# Patient Record
Sex: MALE | Race: BLACK OR AFRICAN AMERICAN | Employment: OTHER | URBAN - METROPOLITAN AREA
[De-identification: names, ages, dates, MRNs, and addresses within clinical notes are randomized per-mention and may not be internally consistent; named-entity substitution may affect disease eponyms.]

---

## 2022-01-01 ENCOUNTER — APPOINTMENT (OUTPATIENT)
Dept: CT IMAGING | Age: 72
DRG: 871 | End: 2022-01-01
Attending: EMERGENCY MEDICINE
Payer: MEDICARE

## 2022-01-01 ENCOUNTER — APPOINTMENT (OUTPATIENT)
Dept: GENERAL RADIOLOGY | Age: 72
DRG: 871 | End: 2022-01-01
Attending: EMERGENCY MEDICINE
Payer: MEDICARE

## 2022-01-01 ENCOUNTER — HOSPICE ADMISSION (OUTPATIENT)
Dept: HOSPICE | Facility: HOSPICE | Age: 72
End: 2022-01-01

## 2022-01-01 ENCOUNTER — APPOINTMENT (OUTPATIENT)
Dept: GENERAL RADIOLOGY | Age: 72
DRG: 871 | End: 2022-01-01
Attending: INTERNAL MEDICINE
Payer: MEDICARE

## 2022-01-01 ENCOUNTER — APPOINTMENT (OUTPATIENT)
Dept: NON INVASIVE DIAGNOSTICS | Age: 72
DRG: 871 | End: 2022-01-01
Attending: INTERNAL MEDICINE
Payer: MEDICARE

## 2022-01-01 ENCOUNTER — HOSPITAL ENCOUNTER (INPATIENT)
Age: 72
LOS: 4 days | DRG: 871 | End: 2022-08-29
Attending: EMERGENCY MEDICINE | Admitting: EMERGENCY MEDICINE
Payer: MEDICARE

## 2022-01-01 VITALS
DIASTOLIC BLOOD PRESSURE: 73 MMHG | BODY MASS INDEX: 14.22 KG/M2 | WEIGHT: 96 LBS | HEART RATE: 60 BPM | TEMPERATURE: 93.4 F | OXYGEN SATURATION: 83 % | RESPIRATION RATE: 24 BRPM | HEIGHT: 69 IN | SYSTOLIC BLOOD PRESSURE: 106 MMHG

## 2022-01-01 DIAGNOSIS — R94.31 ABNORMAL EKG: Primary | ICD-10-CM

## 2022-01-01 LAB
ALBUMIN SERPL-MCNC: 1.2 G/DL (ref 3.4–5)
ALBUMIN SERPL-MCNC: 1.8 G/DL (ref 3.4–5)
ALBUMIN/GLOB SERPL: 0.3 {RATIO} (ref 0.8–1.7)
ALBUMIN/GLOB SERPL: 0.4 {RATIO} (ref 0.8–1.7)
ALP SERPL-CCNC: 122 U/L (ref 45–117)
ALP SERPL-CCNC: 182 U/L (ref 45–117)
ALT SERPL-CCNC: 107 U/L (ref 16–61)
ALT SERPL-CCNC: 118 U/L (ref 16–61)
AMMONIA PLAS-SCNC: 31 UMOL/L (ref 11–32)
ANION GAP SERPL CALC-SCNC: 13 MMOL/L (ref 3–18)
ANION GAP SERPL CALC-SCNC: 13 MMOL/L (ref 3–18)
APPEARANCE UR: ABNORMAL
APTT PPP: 33 SEC (ref 23–36.4)
ARTERIAL PATENCY WRIST A: POSITIVE
AST SERPL-CCNC: 134 U/L (ref 10–38)
AST SERPL-CCNC: 49 U/L (ref 10–38)
B PERT DNA SPEC QL NAA+PROBE: NOT DETECTED
BACTERIA SPEC CULT: ABNORMAL
BACTERIA URNS QL MICRO: ABNORMAL /HPF
BASE DEFICIT BLD-SCNC: 5.3 MMOL/L
BASE DEFICIT BLD-SCNC: 6.4 MMOL/L
BASE DEFICIT BLD-SCNC: 7.6 MMOL/L
BASOPHILS # BLD: 0 K/UL (ref 0–0.1)
BASOPHILS # BLD: 0 K/UL (ref 0–0.1)
BASOPHILS NFR BLD: 0 % (ref 0–2)
BASOPHILS NFR BLD: 0 % (ref 0–2)
BDY SITE: ABNORMAL
BILIRUB SERPL-MCNC: 0.3 MG/DL (ref 0.2–1)
BILIRUB SERPL-MCNC: 0.4 MG/DL (ref 0.2–1)
BILIRUB UR QL: NEGATIVE
BNP SERPL-MCNC: 2919 PG/ML (ref 0–900)
BODY TEMPERATURE: 96
BODY TEMPERATURE: 96
BODY TEMPERATURE: 98.4
BORDETELLA PARAPERTUSSIS PCR, BORPAR: NOT DETECTED
BUN SERPL-MCNC: 187 MG/DL (ref 7–18)
BUN SERPL-MCNC: 189 MG/DL (ref 7–18)
BUN/CREAT SERPL: 41 (ref 12–20)
BUN/CREAT SERPL: 42 (ref 12–20)
C PNEUM DNA SPEC QL NAA+PROBE: NOT DETECTED
CA-I SERPL-SCNC: 1.12 MMOL/L (ref 1.12–1.32)
CALCIUM SERPL-MCNC: 8.1 MG/DL (ref 8.5–10.1)
CALCIUM SERPL-MCNC: 8.7 MG/DL (ref 8.5–10.1)
CC UR VC: ABNORMAL
CHLORIDE SERPL-SCNC: 101 MMOL/L (ref 100–111)
CHLORIDE SERPL-SCNC: 107 MMOL/L (ref 100–111)
CO2 SERPL-SCNC: 18 MMOL/L (ref 21–32)
CO2 SERPL-SCNC: 22 MMOL/L (ref 21–32)
COLOR UR: ABNORMAL
COVID-19 RAPID TEST, COVR: NOT DETECTED
CREAT SERPL-MCNC: 4.5 MG/DL (ref 0.6–1.3)
CREAT SERPL-MCNC: 4.61 MG/DL (ref 0.6–1.3)
DIFFERENTIAL METHOD BLD: ABNORMAL
DIFFERENTIAL METHOD BLD: ABNORMAL
ECHO AO ROOT DIAM: 3.4 CM
ECHO AO ROOT INDEX: 2.25 CM/M2
ECHO AV AREA PEAK VELOCITY: 1.9 CM2
ECHO AV AREA VTI: 1.7 CM2
ECHO AV AREA/BSA PEAK VELOCITY: 1.3 CM2/M2
ECHO AV AREA/BSA VTI: 1.1 CM2/M2
ECHO AV MEAN GRADIENT: 4 MMHG
ECHO AV MEAN VELOCITY: 0.9 M/S
ECHO AV PEAK GRADIENT: 7 MMHG
ECHO AV PEAK VELOCITY: 1.3 M/S
ECHO AV VELOCITY RATIO: 0.62
ECHO AV VTI: 19 CM
ECHO EST RA PRESSURE: 8 MMHG
ECHO LA DIAMETER INDEX: 1.72 CM/M2
ECHO LA DIAMETER: 2.6 CM
ECHO LA TO AORTIC ROOT RATIO: 0.76
ECHO LA VOL 2C: 68 ML (ref 18–58)
ECHO LA VOL 4C: 30 ML (ref 18–58)
ECHO LA VOL BP: 51 ML (ref 18–58)
ECHO LA VOL/BSA BIPLANE: 34 ML/M2 (ref 16–34)
ECHO LA VOLUME AREA LENGTH: 58 ML
ECHO LA VOLUME INDEX A2C: 45 ML/M2 (ref 16–34)
ECHO LA VOLUME INDEX A4C: 20 ML/M2 (ref 16–34)
ECHO LA VOLUME INDEX AREA LENGTH: 38 ML/M2 (ref 16–34)
ECHO LV E' LATERAL VELOCITY: 4 CM/S
ECHO LV EDV A2C: 57 ML
ECHO LV EDV A4C: 61 ML
ECHO LV EDV BP: 61 ML (ref 67–155)
ECHO LV EDV INDEX A4C: 40 ML/M2
ECHO LV EDV INDEX BP: 40 ML/M2
ECHO LV EDV NDEX A2C: 38 ML/M2
ECHO LV EJECTION FRACTION A2C: 40 %
ECHO LV EJECTION FRACTION A4C: 60 %
ECHO LV EJECTION FRACTION BIPLANE: 53 % (ref 55–100)
ECHO LV ESV A2C: 34 ML
ECHO LV ESV A4C: 24 ML
ECHO LV ESV BP: 29 ML (ref 22–58)
ECHO LV ESV INDEX A2C: 23 ML/M2
ECHO LV ESV INDEX A4C: 16 ML/M2
ECHO LV ESV INDEX BP: 19 ML/M2
ECHO LV FRACTIONAL SHORTENING: 37 % (ref 28–44)
ECHO LV INTERNAL DIMENSION DIASTOLE INDEX: 2.85 CM/M2
ECHO LV INTERNAL DIMENSION DIASTOLIC: 4.3 CM (ref 4.2–5.9)
ECHO LV INTERNAL DIMENSION SYSTOLIC INDEX: 1.79 CM/M2
ECHO LV INTERNAL DIMENSION SYSTOLIC: 2.7 CM
ECHO LV IVSD: 0.9 CM (ref 0.6–1)
ECHO LV MASS 2D: 132.7 G (ref 88–224)
ECHO LV MASS INDEX 2D: 87.9 G/M2 (ref 49–115)
ECHO LV POSTERIOR WALL DIASTOLIC: 1 CM (ref 0.6–1)
ECHO LV RELATIVE WALL THICKNESS RATIO: 0.47
ECHO LVOT AREA: 3.1 CM2
ECHO LVOT AV VTI INDEX: 0.57
ECHO LVOT DIAM: 2 CM
ECHO LVOT MEAN GRADIENT: 1 MMHG
ECHO LVOT PEAK GRADIENT: 3 MMHG
ECHO LVOT PEAK VELOCITY: 0.8 M/S
ECHO LVOT STROKE VOLUME INDEX: 22.5 ML/M2
ECHO LVOT SV: 33.9 ML
ECHO LVOT VTI: 10.8 CM
ECHO MV A VELOCITY: 1.38 M/S
ECHO MV E DECELERATION TIME (DT): 133 MS
ECHO MV E VELOCITY: 0.87 M/S
ECHO MV E/A RATIO: 0.63
ECHO MV E/E' LATERAL: 21.75
ECHO RIGHT VENTRICULAR SYSTOLIC PRESSURE (RVSP): 36 MMHG
ECHO RV FREE WALL PEAK S': 12 CM/S
ECHO RV INTERNAL DIMENSION: 3 CM
ECHO RV TAPSE: 1.8 CM (ref 1.7–?)
ECHO RVOT MEAN GRADIENT: 1 MMHG
ECHO RVOT PEAK GRADIENT: 2 MMHG
ECHO RVOT PEAK VELOCITY: 0.8 M/S
ECHO RVOT VTI: 14 CM
ECHO TV REGURGITANT MAX VELOCITY: 2.66 M/S
ECHO TV REGURGITANT PEAK GRADIENT: 28 MMHG
EOSINOPHIL # BLD: 0 K/UL (ref 0–0.4)
EOSINOPHIL # BLD: 0 K/UL (ref 0–0.4)
EOSINOPHIL NFR BLD: 0 % (ref 0–5)
EOSINOPHIL NFR BLD: 0 % (ref 0–5)
EPITH CASTS URNS QL MICRO: ABNORMAL /LPF (ref 0–5)
ERYTHROCYTE [DISTWIDTH] IN BLOOD BY AUTOMATED COUNT: 14.1 % (ref 11.6–14.5)
ERYTHROCYTE [DISTWIDTH] IN BLOOD BY AUTOMATED COUNT: 14.3 % (ref 11.6–14.5)
EST. AVERAGE GLUCOSE BLD GHB EST-MCNC: 249 MG/DL
FLUAV AG NPH QL IA: NEGATIVE
FLUAV H1 2009 PAND RNA SPEC QL NAA+PROBE: NOT DETECTED
FLUAV H1 RNA SPEC QL NAA+PROBE: NOT DETECTED
FLUAV H3 RNA SPEC QL NAA+PROBE: NOT DETECTED
FLUAV SUBTYP SPEC NAA+PROBE: NOT DETECTED
FLUBV AG NOSE QL IA: NEGATIVE
FLUBV RNA SPEC QL NAA+PROBE: NOT DETECTED
GAS FLOW.O2 O2 DELIVERY SYS: ABNORMAL L/MIN
GAS FLOW.O2 SETTING OXYMISER: 18 BPM
GLOBULIN SER CALC-MCNC: 4.2 G/DL (ref 2–4)
GLOBULIN SER CALC-MCNC: 4.6 G/DL (ref 2–4)
GLUCOSE BLD STRIP.AUTO-MCNC: 108 MG/DL (ref 70–110)
GLUCOSE BLD STRIP.AUTO-MCNC: 204 MG/DL (ref 70–110)
GLUCOSE BLD STRIP.AUTO-MCNC: 287 MG/DL (ref 70–110)
GLUCOSE BLD STRIP.AUTO-MCNC: 372 MG/DL (ref 70–110)
GLUCOSE BLD STRIP.AUTO-MCNC: 386 MG/DL (ref 70–110)
GLUCOSE SERPL-MCNC: 190 MG/DL (ref 74–99)
GLUCOSE SERPL-MCNC: 421 MG/DL (ref 74–99)
GLUCOSE UR STRIP.AUTO-MCNC: 100 MG/DL
GRAM STN SPEC: ABNORMAL
HADV DNA SPEC QL NAA+PROBE: NOT DETECTED
HBA1C MFR BLD: 10.3 % (ref 4.2–5.6)
HCO3 BLD-SCNC: 17.8 MMOL/L (ref 22–26)
HCO3 BLD-SCNC: 19.1 MMOL/L (ref 22–26)
HCO3 BLD-SCNC: 19.3 MMOL/L (ref 22–26)
HCOV 229E RNA SPEC QL NAA+PROBE: NOT DETECTED
HCOV HKU1 RNA SPEC QL NAA+PROBE: NOT DETECTED
HCOV NL63 RNA SPEC QL NAA+PROBE: NOT DETECTED
HCOV OC43 RNA SPEC QL NAA+PROBE: NOT DETECTED
HCT VFR BLD AUTO: 25.8 % (ref 36–48)
HCT VFR BLD AUTO: 29.3 % (ref 36–48)
HGB BLD-MCNC: 8.5 G/DL (ref 13–16)
HGB BLD-MCNC: 9.9 G/DL (ref 13–16)
HGB UR QL STRIP: NEGATIVE
HMPV RNA SPEC QL NAA+PROBE: NOT DETECTED
HPIV1 RNA SPEC QL NAA+PROBE: NOT DETECTED
HPIV2 RNA SPEC QL NAA+PROBE: NOT DETECTED
HPIV3 RNA SPEC QL NAA+PROBE: NOT DETECTED
HPIV4 RNA SPEC QL NAA+PROBE: NOT DETECTED
IMM GRANULOCYTES # BLD AUTO: 0 K/UL
IMM GRANULOCYTES # BLD AUTO: 0 K/UL
IMM GRANULOCYTES NFR BLD AUTO: 0 %
IMM GRANULOCYTES NFR BLD AUTO: 0 %
INR PPP: 1.3 (ref 0.8–1.2)
KETONES UR QL STRIP.AUTO: 15 MG/DL
LACTATE BLD-SCNC: 1.95 MMOL/L (ref 0.4–2)
LACTATE SERPL-SCNC: 1.9 MMOL/L (ref 0.4–2)
LACTATE SERPL-SCNC: 2.3 MMOL/L (ref 0.4–2)
LACTATE SERPL-SCNC: 3.2 MMOL/L (ref 0.4–2)
LEUKOCYTE ESTERASE UR QL STRIP.AUTO: ABNORMAL
LIPASE SERPL-CCNC: 339 U/L (ref 73–393)
LYMPHOCYTES # BLD: 0.4 K/UL (ref 0.9–3.6)
LYMPHOCYTES # BLD: 0.4 K/UL (ref 0.9–3.6)
LYMPHOCYTES NFR BLD: 3 % (ref 21–52)
LYMPHOCYTES NFR BLD: 4 % (ref 21–52)
M PNEUMO DNA SPEC QL NAA+PROBE: NOT DETECTED
MAGNESIUM SERPL-MCNC: 2.2 MG/DL (ref 1.6–2.6)
MCH RBC QN AUTO: 25.8 PG (ref 24–34)
MCH RBC QN AUTO: 26.1 PG (ref 24–34)
MCHC RBC AUTO-ENTMCNC: 32.9 G/DL (ref 31–37)
MCHC RBC AUTO-ENTMCNC: 33.8 G/DL (ref 31–37)
MCV RBC AUTO: 77.1 FL (ref 78–100)
MCV RBC AUTO: 78.2 FL (ref 78–100)
METAMYELOCYTES NFR BLD MANUAL: 11 %
METAMYELOCYTES NFR BLD MANUAL: 5 %
MONOCYTES # BLD: 0.1 K/UL (ref 0.05–1.2)
MONOCYTES # BLD: 0.1 K/UL (ref 0.05–1.2)
MONOCYTES NFR BLD: 1 % (ref 3–10)
MONOCYTES NFR BLD: 1 % (ref 3–10)
MYELOCYTES NFR BLD MANUAL: 5 %
NEUTS BAND NFR BLD MANUAL: 17 % (ref 0–5)
NEUTS BAND NFR BLD MANUAL: 31 % (ref 0–5)
NEUTS SEG # BLD: 11 K/UL (ref 1.8–8)
NEUTS SEG # BLD: 7.6 K/UL (ref 1.8–8)
NEUTS SEG NFR BLD: 60 % (ref 40–73)
NEUTS SEG NFR BLD: 62 % (ref 40–73)
NITRITE UR QL STRIP.AUTO: POSITIVE
NRBC # BLD: 0 K/UL (ref 0–0.01)
NRBC # BLD: 0 K/UL (ref 0–0.01)
NRBC BLD-RTO: 0 PER 100 WBC
NRBC BLD-RTO: 0 PER 100 WBC
O2/TOTAL GAS SETTING VFR VENT: 100 %
O2/TOTAL GAS SETTING VFR VENT: 100 %
PCO2 BLD: 30.3 MMHG (ref 35–45)
PCO2 BLD: 34.8 MMHG (ref 35–45)
PCO2 BLD: 35.5 MMHG (ref 35–45)
PEEP RESPIRATORY: 5 CMH2O
PEEP RESPIRATORY: 6 CMH2O
PH BLD: 7.32 [PH] (ref 7.35–7.45)
PH BLD: 7.34 [PH] (ref 7.35–7.45)
PH BLD: 7.4 [PH] (ref 7.35–7.45)
PH UR STRIP: 5.5 [PH] (ref 5–8)
PHOSPHATE SERPL-MCNC: 6.7 MG/DL (ref 2.5–4.9)
PIP ISTAT,IPIP: 12
PIP ISTAT,IPIP: 24
PLATELET # BLD AUTO: 198 K/UL (ref 135–420)
PLATELET # BLD AUTO: 318 K/UL (ref 135–420)
PLATELET COMMENTS,PCOM: ABNORMAL
PLATELET COMMENTS,PCOM: ABNORMAL
PMV BLD AUTO: 10.7 FL (ref 9.2–11.8)
PMV BLD AUTO: 10.9 FL (ref 9.2–11.8)
PO2 BLD: 219 MMHG (ref 80–100)
PO2 BLD: 45 MMHG (ref 80–100)
PO2 BLD: 47 MMHG (ref 80–100)
POTASSIUM SERPL-SCNC: 4.8 MMOL/L (ref 3.5–5.5)
POTASSIUM SERPL-SCNC: 5.2 MMOL/L (ref 3.5–5.5)
PROCALCITONIN SERPL-MCNC: 4.94 NG/ML
PROT SERPL-MCNC: 5.8 G/DL (ref 6.4–8.2)
PROT SERPL-MCNC: 6 G/DL (ref 6.4–8.2)
PROT UR STRIP-MCNC: >300 MG/DL
PROTHROMBIN TIME: 16.5 SEC (ref 11.5–15.2)
RBC # BLD AUTO: 3.3 M/UL (ref 4.35–5.65)
RBC # BLD AUTO: 3.8 M/UL (ref 4.35–5.65)
RBC #/AREA URNS HPF: ABNORMAL /HPF (ref 0–5)
RBC MORPH BLD: ABNORMAL
RSV RNA SPEC QL NAA+PROBE: NOT DETECTED
RV+EV RNA SPEC QL NAA+PROBE: NOT DETECTED
SAO2 % BLD: 83.9 % (ref 92–97)
SAO2 % BLD: 84.8 % (ref 92–97)
SAO2 % BLD: 99.7 % (ref 92–97)
SARS-COV-2 PCR, COVPCR: NOT DETECTED
SERVICE CMNT-IMP: ABNORMAL
SODIUM SERPL-SCNC: 136 MMOL/L (ref 136–145)
SODIUM SERPL-SCNC: 138 MMOL/L (ref 136–145)
SOURCE, COVRS: NORMAL
SP GR UR REFRACTOMETRY: 1.02 (ref 1–1.03)
SPECIMEN TYPE: ABNORMAL
TOTAL RESP. RATE, ITRR: 20
TROPONIN-HIGH SENSITIVITY: 86 NG/L (ref 0–78)
TSH SERPL DL<=0.05 MIU/L-ACNC: 1.86 UIU/ML (ref 0.36–3.74)
UROBILINOGEN UR QL STRIP.AUTO: 0.2 EU/DL (ref 0.2–1)
VANCOMYCIN SERPL-MCNC: 14 UG/ML (ref 5–40)
VENTILATION MODE VENT: ABNORMAL
VENTILATION MODE VENT: ABNORMAL
VT SETTING VENT: 400 ML
VT SETTING VENT: 450 ML
WBC # BLD AUTO: 12.1 K/UL (ref 4.6–13.2)
WBC # BLD AUTO: 9.6 K/UL (ref 4.6–13.2)
WBC URNS QL MICRO: ABNORMAL /HPF (ref 0–5)

## 2022-01-01 PROCEDURE — 74011000258 HC RX REV CODE- 258: Performed by: EMERGENCY MEDICINE

## 2022-01-01 PROCEDURE — 74011250636 HC RX REV CODE- 250/636: Performed by: NURSE PRACTITIONER

## 2022-01-01 PROCEDURE — 87635 SARS-COV-2 COVID-19 AMP PRB: CPT

## 2022-01-01 PROCEDURE — 74011250637 HC RX REV CODE- 250/637: Performed by: INTERNAL MEDICINE

## 2022-01-01 PROCEDURE — 0BH17EZ INSERTION OF ENDOTRACHEAL AIRWAY INTO TRACHEA, VIA NATURAL OR ARTIFICIAL OPENING: ICD-10-PCS | Performed by: EMERGENCY MEDICINE

## 2022-01-01 PROCEDURE — 87040 BLOOD CULTURE FOR BACTERIA: CPT

## 2022-01-01 PROCEDURE — 77030040392 HC DRSG OPTIFOAM MDII -A

## 2022-01-01 PROCEDURE — 74011636637 HC RX REV CODE- 636/637: Performed by: INTERNAL MEDICINE

## 2022-01-01 PROCEDURE — 93005 ELECTROCARDIOGRAM TRACING: CPT

## 2022-01-01 PROCEDURE — 85025 COMPLETE CBC W/AUTO DIFF WBC: CPT

## 2022-01-01 PROCEDURE — 74011000258 HC RX REV CODE- 258: Performed by: INTERNAL MEDICINE

## 2022-01-01 PROCEDURE — 80202 ASSAY OF VANCOMYCIN: CPT

## 2022-01-01 PROCEDURE — 87077 CULTURE AEROBIC IDENTIFY: CPT

## 2022-01-01 PROCEDURE — 77010033678 HC OXYGEN DAILY

## 2022-01-01 PROCEDURE — 74011000250 HC RX REV CODE- 250: Performed by: INTERNAL MEDICINE

## 2022-01-01 PROCEDURE — 87804 INFLUENZA ASSAY W/OPTIC: CPT

## 2022-01-01 PROCEDURE — 36415 COLL VENOUS BLD VENIPUNCTURE: CPT

## 2022-01-01 PROCEDURE — P9047 ALBUMIN (HUMAN), 25%, 50ML: HCPCS | Performed by: INTERNAL MEDICINE

## 2022-01-01 PROCEDURE — 82962 GLUCOSE BLOOD TEST: CPT

## 2022-01-01 PROCEDURE — 96375 TX/PRO/DX INJ NEW DRUG ADDON: CPT

## 2022-01-01 PROCEDURE — 99285 EMERGENCY DEPT VISIT HI MDM: CPT

## 2022-01-01 PROCEDURE — 80053 COMPREHEN METABOLIC PANEL: CPT

## 2022-01-01 PROCEDURE — 77030040393 HC DRSG OPTIFOAM GENT MDII -B

## 2022-01-01 PROCEDURE — 83036 HEMOGLOBIN GLYCOSYLATED A1C: CPT

## 2022-01-01 PROCEDURE — 82803 BLOOD GASES ANY COMBINATION: CPT

## 2022-01-01 PROCEDURE — 74011250636 HC RX REV CODE- 250/636: Performed by: EMERGENCY MEDICINE

## 2022-01-01 PROCEDURE — 0202U NFCT DS 22 TRGT SARS-COV-2: CPT

## 2022-01-01 PROCEDURE — 93306 TTE W/DOPPLER COMPLETE: CPT

## 2022-01-01 PROCEDURE — 71045 X-RAY EXAM CHEST 1 VIEW: CPT

## 2022-01-01 PROCEDURE — 74011000250 HC RX REV CODE- 250: Performed by: NURSE PRACTITIONER

## 2022-01-01 PROCEDURE — 83605 ASSAY OF LACTIC ACID: CPT

## 2022-01-01 PROCEDURE — 82140 ASSAY OF AMMONIA: CPT

## 2022-01-01 PROCEDURE — 02HV33Z INSERTION OF INFUSION DEVICE INTO SUPERIOR VENA CAVA, PERCUTANEOUS APPROACH: ICD-10-PCS | Performed by: EMERGENCY MEDICINE

## 2022-01-01 PROCEDURE — 70450 CT HEAD/BRAIN W/O DYE: CPT

## 2022-01-01 PROCEDURE — 74011250636 HC RX REV CODE- 250/636: Performed by: INTERNAL MEDICINE

## 2022-01-01 PROCEDURE — 65610000006 HC RM INTENSIVE CARE

## 2022-01-01 PROCEDURE — 82330 ASSAY OF CALCIUM: CPT

## 2022-01-01 PROCEDURE — 99221 1ST HOSP IP/OBS SF/LOW 40: CPT | Performed by: NURSE PRACTITIONER

## 2022-01-01 PROCEDURE — 87086 URINE CULTURE/COLONY COUNT: CPT

## 2022-01-01 PROCEDURE — 31500 INSERT EMERGENCY AIRWAY: CPT

## 2022-01-01 PROCEDURE — 87150 DNA/RNA AMPLIFIED PROBE: CPT

## 2022-01-01 PROCEDURE — 51702 INSERT TEMP BLADDER CATH: CPT

## 2022-01-01 PROCEDURE — 83880 ASSAY OF NATRIURETIC PEPTIDE: CPT

## 2022-01-01 PROCEDURE — 96367 TX/PROPH/DG ADDL SEQ IV INF: CPT

## 2022-01-01 PROCEDURE — 94002 VENT MGMT INPAT INIT DAY: CPT

## 2022-01-01 PROCEDURE — 74011000250 HC RX REV CODE- 250

## 2022-01-01 PROCEDURE — 84484 ASSAY OF TROPONIN QUANT: CPT

## 2022-01-01 PROCEDURE — 65270000029 HC RM PRIVATE

## 2022-01-01 PROCEDURE — C9113 INJ PANTOPRAZOLE SODIUM, VIA: HCPCS | Performed by: EMERGENCY MEDICINE

## 2022-01-01 PROCEDURE — 74011000250 HC RX REV CODE- 250: Performed by: EMERGENCY MEDICINE

## 2022-01-01 PROCEDURE — 85610 PROTHROMBIN TIME: CPT

## 2022-01-01 PROCEDURE — 2709999900 HC NON-CHARGEABLE SUPPLY

## 2022-01-01 PROCEDURE — 81001 URINALYSIS AUTO W/SCOPE: CPT

## 2022-01-01 PROCEDURE — 87186 SC STD MICRODIL/AGAR DIL: CPT

## 2022-01-01 PROCEDURE — 96365 THER/PROPH/DIAG IV INF INIT: CPT

## 2022-01-01 PROCEDURE — 83735 ASSAY OF MAGNESIUM: CPT

## 2022-01-01 PROCEDURE — 84145 PROCALCITONIN (PCT): CPT

## 2022-01-01 PROCEDURE — 36600 WITHDRAWAL OF ARTERIAL BLOOD: CPT

## 2022-01-01 PROCEDURE — 84443 ASSAY THYROID STIM HORMONE: CPT

## 2022-01-01 PROCEDURE — 84100 ASSAY OF PHOSPHORUS: CPT

## 2022-01-01 PROCEDURE — 83690 ASSAY OF LIPASE: CPT

## 2022-01-01 PROCEDURE — 94003 VENT MGMT INPAT SUBQ DAY: CPT

## 2022-01-01 PROCEDURE — 96368 THER/DIAG CONCURRENT INF: CPT

## 2022-01-01 PROCEDURE — 71250 CT THORAX DX C-: CPT

## 2022-01-01 PROCEDURE — 5A1945Z RESPIRATORY VENTILATION, 24-96 CONSECUTIVE HOURS: ICD-10-PCS | Performed by: EMERGENCY MEDICINE

## 2022-01-01 PROCEDURE — 85730 THROMBOPLASTIN TIME PARTIAL: CPT

## 2022-01-01 RX ORDER — FACIAL-BODY WIPES
10 EACH TOPICAL DAILY PRN
Status: DISCONTINUED | OUTPATIENT
Start: 2022-01-01 | End: 2022-08-30 | Stop reason: HOSPADM

## 2022-01-01 RX ORDER — MAGNESIUM SULFATE 100 %
4 CRYSTALS MISCELLANEOUS AS NEEDED
Status: DISCONTINUED | OUTPATIENT
Start: 2022-01-01 | End: 2022-01-01

## 2022-01-01 RX ORDER — LORAZEPAM 2 MG/ML
1 INJECTION, SOLUTION INTRAMUSCULAR; INTRAVENOUS
Status: DISCONTINUED | OUTPATIENT
Start: 2022-01-01 | End: 2022-08-30 | Stop reason: HOSPADM

## 2022-01-01 RX ORDER — GLYCOPYRROLATE 0.2 MG/ML
0.2 INJECTION INTRAMUSCULAR; INTRAVENOUS
Status: DISCONTINUED | OUTPATIENT
Start: 2022-01-01 | End: 2022-08-30 | Stop reason: HOSPADM

## 2022-01-01 RX ORDER — FENTANYL CITRATE-0.9 % NACL/PF 25 MCG/ML
0-200 PLASTIC BAG, INJECTION (ML) INJECTION
Status: DISCONTINUED | OUTPATIENT
Start: 2022-01-01 | End: 2022-01-01 | Stop reason: CLARIF

## 2022-01-01 RX ORDER — NOREPINEPHRINE BITARTRATE/D5W 8 MG/250ML
PLASTIC BAG, INJECTION (ML) INTRAVENOUS
Status: DISPENSED
Start: 2022-01-01 | End: 2022-01-01

## 2022-01-01 RX ORDER — NOREPINEPHRINE BITARTRATE/D5W 8 MG/250ML
2-16 PLASTIC BAG, INJECTION (ML) INTRAVENOUS
Status: DISCONTINUED | OUTPATIENT
Start: 2022-01-01 | End: 2022-01-01

## 2022-01-01 RX ORDER — PHENYLEPHRINE HCL IN 0.9% NACL 1 MG/10 ML
200 SYRINGE (ML) INTRAVENOUS ONCE
Status: COMPLETED | OUTPATIENT
Start: 2022-01-01 | End: 2022-01-01

## 2022-01-01 RX ORDER — PHENYLEPHRINE HCL IN 0.9% NACL 1 MG/10 ML
200 SYRINGE (ML) INTRAVENOUS ONCE
Status: DISCONTINUED | OUTPATIENT
Start: 2022-01-01 | End: 2022-01-01

## 2022-01-01 RX ORDER — KETAMINE HYDROCHLORIDE 50 MG/ML
3 INJECTION, SOLUTION INTRAMUSCULAR; INTRAVENOUS
Status: COMPLETED | OUTPATIENT
Start: 2022-01-01 | End: 2022-01-01

## 2022-01-01 RX ORDER — ALBUMIN HUMAN 250 G/1000ML
12.5 SOLUTION INTRAVENOUS EVERY 6 HOURS
Status: DISCONTINUED | OUTPATIENT
Start: 2022-01-01 | End: 2022-01-01

## 2022-01-01 RX ORDER — SCOLOPAMINE TRANSDERMAL SYSTEM 1 MG/1
1 PATCH, EXTENDED RELEASE TRANSDERMAL
Status: DISCONTINUED | OUTPATIENT
Start: 2022-01-01 | End: 2022-08-30 | Stop reason: HOSPADM

## 2022-01-01 RX ORDER — CLINDAMYCIN PHOSPHATE 600 MG/50ML
600 INJECTION INTRAVENOUS EVERY 8 HOURS
Status: DISCONTINUED | OUTPATIENT
Start: 2022-01-01 | End: 2022-01-01

## 2022-01-01 RX ORDER — INSULIN LISPRO 100 [IU]/ML
INJECTION, SOLUTION INTRAVENOUS; SUBCUTANEOUS EVERY 6 HOURS
Status: DISCONTINUED | OUTPATIENT
Start: 2022-01-01 | End: 2022-01-01

## 2022-01-01 RX ORDER — EPINEPHRINE 0.1 MG/ML
0.02 INJECTION INTRACARDIAC; INTRAVENOUS
Status: COMPLETED | OUTPATIENT
Start: 2022-01-01 | End: 2022-01-01

## 2022-01-01 RX ORDER — ACETAMINOPHEN 650 MG/1
650 SUPPOSITORY RECTAL
Status: DISCONTINUED | OUTPATIENT
Start: 2022-01-01 | End: 2022-08-30 | Stop reason: HOSPADM

## 2022-01-01 RX ORDER — SODIUM CHLORIDE 9 MG/ML
75 INJECTION, SOLUTION INTRAVENOUS CONTINUOUS
Status: DISCONTINUED | OUTPATIENT
Start: 2022-01-01 | End: 2022-01-01

## 2022-01-01 RX ORDER — ONDANSETRON 2 MG/ML
4 INJECTION INTRAMUSCULAR; INTRAVENOUS
Status: DISCONTINUED | OUTPATIENT
Start: 2022-01-01 | End: 2022-08-30 | Stop reason: HOSPADM

## 2022-01-01 RX ORDER — NOREPINEPHRINE BITARTRATE/D5W 8 MG/250ML
2-16 PLASTIC BAG, INJECTION (ML) INTRAVENOUS
Status: DISCONTINUED | OUTPATIENT
Start: 2022-01-01 | End: 2022-01-01 | Stop reason: SDUPTHER

## 2022-01-01 RX ORDER — PANTOPRAZOLE SODIUM 40 MG/10ML
40 INJECTION, POWDER, LYOPHILIZED, FOR SOLUTION INTRAVENOUS
Status: COMPLETED | OUTPATIENT
Start: 2022-01-01 | End: 2022-01-01

## 2022-01-01 RX ORDER — ETOMIDATE 2 MG/ML
20 INJECTION INTRAVENOUS
Status: DISCONTINUED | OUTPATIENT
Start: 2022-01-01 | End: 2022-01-01

## 2022-01-01 RX ORDER — MIDAZOLAM IN 0.9 % SOD.CHLORID 1 MG/ML
0-10 PLASTIC BAG, INJECTION (ML) INTRAVENOUS
Status: DISCONTINUED | OUTPATIENT
Start: 2022-01-01 | End: 2022-01-01

## 2022-01-01 RX ORDER — ACETAMINOPHEN 325 MG/1
650 TABLET ORAL
Status: DISCONTINUED | OUTPATIENT
Start: 2022-01-01 | End: 2022-08-30 | Stop reason: HOSPADM

## 2022-01-01 RX ORDER — LEVOFLOXACIN 5 MG/ML
750 INJECTION, SOLUTION INTRAVENOUS EVERY 24 HOURS
Status: DISCONTINUED | OUTPATIENT
Start: 2022-01-01 | End: 2022-01-01 | Stop reason: DRUGHIGH

## 2022-01-01 RX ORDER — PHENYLEPHRINE HCL IN 0.9% NACL 1 MG/10 ML
500 SYRINGE (ML) INTRAVENOUS ONCE
Status: COMPLETED | OUTPATIENT
Start: 2022-01-01 | End: 2022-01-01

## 2022-01-01 RX ORDER — CHLORHEXIDINE GLUCONATE 1.2 MG/ML
10 RINSE ORAL EVERY 12 HOURS
Status: DISCONTINUED | OUTPATIENT
Start: 2022-01-01 | End: 2022-01-01

## 2022-01-01 RX ORDER — ROCURONIUM BROMIDE 10 MG/ML
1.2 INJECTION, SOLUTION INTRAVENOUS
Status: COMPLETED | OUTPATIENT
Start: 2022-01-01 | End: 2022-01-01

## 2022-01-01 RX ORDER — INSULIN GLARGINE 100 [IU]/ML
8 INJECTION, SOLUTION SUBCUTANEOUS DAILY
Status: DISCONTINUED | OUTPATIENT
Start: 2022-01-01 | End: 2022-01-01

## 2022-01-01 RX ORDER — LORAZEPAM 2 MG/ML
1 CONCENTRATE ORAL
Status: DISCONTINUED | OUTPATIENT
Start: 2022-01-01 | End: 2022-01-01

## 2022-01-01 RX ORDER — MORPHINE SULFATE 2 MG/ML
2 INJECTION, SOLUTION INTRAMUSCULAR; INTRAVENOUS
Status: DISCONTINUED | OUTPATIENT
Start: 2022-01-01 | End: 2022-08-30 | Stop reason: HOSPADM

## 2022-01-01 RX ORDER — DEXTROSE MONOHYDRATE 100 MG/ML
0-250 INJECTION, SOLUTION INTRAVENOUS AS NEEDED
Status: DISCONTINUED | OUTPATIENT
Start: 2022-01-01 | End: 2022-01-01

## 2022-01-01 RX ORDER — LEVOFLOXACIN 5 MG/ML
500 INJECTION, SOLUTION INTRAVENOUS
Status: DISCONTINUED | OUTPATIENT
Start: 2022-01-01 | End: 2022-01-01

## 2022-01-01 RX ADMIN — NOREPINEPHRINE BITARTRATE 4 MCG/MIN: 8 INJECTION, SOLUTION INTRAVENOUS at 10:12

## 2022-01-01 RX ADMIN — NOREPINEPHRINE BITARTRATE 15 MCG/MIN: 8 INJECTION, SOLUTION INTRAVENOUS at 05:22

## 2022-01-01 RX ADMIN — SODIUM CHLORIDE 500 ML: 900 INJECTION, SOLUTION INTRAVENOUS at 12:42

## 2022-01-01 RX ADMIN — ALBUMIN (HUMAN) 12.5 G: 0.25 INJECTION, SOLUTION INTRAVENOUS at 05:22

## 2022-01-01 RX ADMIN — GLYCOPYRROLATE 0.2 MG: 0.2 INJECTION, SOLUTION INTRAMUSCULAR; INTRAVENOUS at 15:28

## 2022-01-01 RX ADMIN — ALBUMIN (HUMAN) 12.5 G: 0.25 INJECTION, SOLUTION INTRAVENOUS at 18:18

## 2022-01-01 RX ADMIN — MIDAZOLAM 1 MG/HR: 5 INJECTION INTRAMUSCULAR; INTRAVENOUS at 15:29

## 2022-01-01 RX ADMIN — MORPHINE SULFATE 2 MG: 2 INJECTION, SOLUTION INTRAMUSCULAR; INTRAVENOUS at 16:25

## 2022-01-01 RX ADMIN — INSULIN GLARGINE 8 UNITS: 100 INJECTION, SOLUTION SUBCUTANEOUS at 11:08

## 2022-01-01 RX ADMIN — MORPHINE SULFATE 2 MG: 2 INJECTION, SOLUTION INTRAMUSCULAR; INTRAVENOUS at 21:31

## 2022-01-01 RX ADMIN — MORPHINE SULFATE 2 MG: 2 INJECTION, SOLUTION INTRAMUSCULAR; INTRAVENOUS at 09:52

## 2022-01-01 RX ADMIN — CLINDAMYCIN IN 5 PERCENT DEXTROSE 600 MG: 12 INJECTION, SOLUTION INTRAVENOUS at 20:55

## 2022-01-01 RX ADMIN — METHYLPREDNISOLONE SODIUM SUCCINATE 125 MG: 125 INJECTION, POWDER, FOR SOLUTION INTRAMUSCULAR; INTRAVENOUS at 12:35

## 2022-01-01 RX ADMIN — NOREPINEPHRINE BITARTRATE 15 MCG/MIN: 8 INJECTION, SOLUTION INTRAVENOUS at 21:29

## 2022-01-01 RX ADMIN — SODIUM CHLORIDE 75 ML/HR: 9 INJECTION, SOLUTION INTRAVENOUS at 05:22

## 2022-01-01 RX ADMIN — ROCURONIUM BROMIDE 52.2 MG: 10 INJECTION INTRAVENOUS at 11:01

## 2022-01-01 RX ADMIN — KETAMINE HYDROCHLORIDE 130.5 MG: 50 INJECTION, SOLUTION INTRAMUSCULAR; INTRAVENOUS at 11:01

## 2022-01-01 RX ADMIN — FENTANYL CITRATE 50 MCG/HR: 50 INJECTION INTRAVENOUS at 15:29

## 2022-01-01 RX ADMIN — CHLORHEXIDINE GLUCONATE 10 ML: 1.2 RINSE BUCCAL at 08:08

## 2022-01-01 RX ADMIN — Medication 200 MCG: at 10:38

## 2022-01-01 RX ADMIN — CHLORHEXIDINE GLUCONATE 10 ML: 1.2 RINSE BUCCAL at 15:56

## 2022-01-01 RX ADMIN — ALBUMIN (HUMAN) 12.5 G: 0.25 INJECTION, SOLUTION INTRAVENOUS at 23:37

## 2022-01-01 RX ADMIN — VASOPRESSIN 0.03 UNITS/MIN: 20 INJECTION INTRAVENOUS at 01:42

## 2022-01-01 RX ADMIN — INSULIN LISPRO 10 UNITS: 100 INJECTION, SOLUTION INTRAVENOUS; SUBCUTANEOUS at 18:18

## 2022-01-01 RX ADMIN — NOREPINEPHRINE BITARTRATE 14 MCG/MIN: 8 INJECTION, SOLUTION INTRAVENOUS at 15:34

## 2022-01-01 RX ADMIN — Medication 500 MCG: at 10:42

## 2022-01-01 RX ADMIN — MORPHINE SULFATE 2 MG: 2 INJECTION, SOLUTION INTRAMUSCULAR; INTRAVENOUS at 21:13

## 2022-01-01 RX ADMIN — INSULIN LISPRO 6 UNITS: 100 INJECTION, SOLUTION INTRAVENOUS; SUBCUTANEOUS at 23:37

## 2022-01-01 RX ADMIN — MORPHINE SULFATE 2 MG: 2 INJECTION, SOLUTION INTRAMUSCULAR; INTRAVENOUS at 22:49

## 2022-01-01 RX ADMIN — PANTOPRAZOLE SODIUM 40 MG: 40 INJECTION, POWDER, FOR SOLUTION INTRAVENOUS at 12:17

## 2022-01-01 RX ADMIN — CHLORHEXIDINE GLUCONATE 10 ML: 1.2 RINSE BUCCAL at 21:17

## 2022-01-01 RX ADMIN — LEVOFLOXACIN 750 MG: 5 INJECTION, SOLUTION INTRAVENOUS at 12:21

## 2022-01-01 RX ADMIN — MORPHINE SULFATE 2 MG: 2 INJECTION, SOLUTION INTRAMUSCULAR; INTRAVENOUS at 12:33

## 2022-01-01 RX ADMIN — CLINDAMYCIN IN 5 PERCENT DEXTROSE 600 MG: 12 INJECTION, SOLUTION INTRAVENOUS at 12:19

## 2022-01-01 RX ADMIN — SODIUM CHLORIDE 75 ML/HR: 9 INJECTION, SOLUTION INTRAVENOUS at 15:35

## 2022-01-01 RX ADMIN — VANCOMYCIN HYDROCHLORIDE 1000 MG: 1 INJECTION, POWDER, LYOPHILIZED, FOR SOLUTION INTRAVENOUS at 13:04

## 2022-01-01 RX ADMIN — MORPHINE SULFATE 2 MG: 2 INJECTION, SOLUTION INTRAMUSCULAR; INTRAVENOUS at 22:19

## 2022-01-01 RX ADMIN — MORPHINE SULFATE 2 MG: 2 INJECTION, SOLUTION INTRAMUSCULAR; INTRAVENOUS at 00:31

## 2022-01-01 RX ADMIN — MORPHINE SULFATE 2 MG: 2 INJECTION, SOLUTION INTRAMUSCULAR; INTRAVENOUS at 12:10

## 2022-01-01 RX ADMIN — PIPERACILLIN AND TAZOBACTAM 4.5 G: 4; .5 INJECTION, POWDER, FOR SOLUTION INTRAVENOUS at 21:17

## 2022-01-01 RX ADMIN — CLINDAMYCIN IN 5 PERCENT DEXTROSE 600 MG: 12 INJECTION, SOLUTION INTRAVENOUS at 03:48

## 2022-01-01 RX ADMIN — EPINEPHRINE 10 MCG/MIN: 1 INJECTION, SOLUTION, CONCENTRATE INTRAVENOUS at 18:48

## 2022-01-01 RX ADMIN — ALBUMIN (HUMAN) 12.5 G: 0.25 INJECTION, SOLUTION INTRAVENOUS at 12:52

## 2022-01-01 RX ADMIN — PIPERACILLIN AND TAZOBACTAM 4.5 G: 4; .5 INJECTION, POWDER, LYOPHILIZED, FOR SOLUTION INTRAVENOUS at 12:24

## 2022-01-01 RX ADMIN — EPINEPHRINE 1 MCG/MIN: 1 INJECTION, SOLUTION, CONCENTRATE INTRAVENOUS at 11:28

## 2022-01-01 RX ADMIN — PIPERACILLIN AND TAZOBACTAM 4.5 G: 4; .5 INJECTION, POWDER, FOR SOLUTION INTRAVENOUS at 08:08

## 2022-01-01 RX ADMIN — Medication 200 MCG: at 10:33

## 2022-01-01 RX ADMIN — EPINEPHRINE 9 MCG/MIN: 1 INJECTION, SOLUTION, CONCENTRATE INTRAVENOUS at 02:32

## 2022-01-01 RX ADMIN — SODIUM ZIRCONIUM CYCLOSILICATE 10 G: 5 POWDER, FOR SUSPENSION ORAL at 11:09

## 2022-01-01 RX ADMIN — NOREPINEPHRINE BITARTRATE 4 MCG/MIN: 8 INJECTION, SOLUTION INTRAVENOUS at 12:43

## 2022-01-01 RX ADMIN — INSULIN LISPRO 6 UNITS: 100 INJECTION, SOLUTION INTRAVENOUS; SUBCUTANEOUS at 05:22

## 2022-01-01 RX ADMIN — VASOPRESSIN 0.03 UNITS/MIN: 20 INJECTION INTRAVENOUS at 16:15

## 2022-01-01 RX ADMIN — MORPHINE SULFATE 2 MG: 2 INJECTION, SOLUTION INTRAMUSCULAR; INTRAVENOUS at 15:46

## 2022-01-01 RX ADMIN — EPINEPHRINE 0.02 MG: 0.1 INJECTION, SOLUTION ENDOTRACHEAL; INTRACARDIAC; INTRAVENOUS at 10:53

## 2022-01-01 RX ADMIN — ALBUMIN (HUMAN) 12.5 G: 0.25 INJECTION, SOLUTION INTRAVENOUS at 11:08

## 2022-01-01 RX ADMIN — MORPHINE SULFATE 2 MG: 2 INJECTION, SOLUTION INTRAMUSCULAR; INTRAVENOUS at 15:28

## 2022-08-25 PROBLEM — E43 SEVERE PROTEIN-CALORIE MALNUTRITION (HCC): Status: ACTIVE | Noted: 2022-01-01

## 2022-08-25 PROBLEM — Z74.01 BEDBOUND: Status: ACTIVE | Noted: 2022-01-01

## 2022-08-25 PROBLEM — R57.9 SHOCK (HCC): Status: ACTIVE | Noted: 2022-01-01

## 2022-08-25 PROBLEM — L89.159 PRESSURE ULCER OF SACRAL REGION: Status: ACTIVE | Noted: 2022-01-01

## 2022-08-25 PROBLEM — L89.45: Status: ACTIVE | Noted: 2022-01-01

## 2022-08-25 PROBLEM — A41.9 SEPSIS (HCC): Status: ACTIVE | Noted: 2022-01-01

## 2022-08-25 PROBLEM — J96.01 ACUTE RESPIRATORY FAILURE WITH HYPOXIA (HCC): Status: ACTIVE | Noted: 2022-01-01

## 2022-08-25 PROBLEM — L89.40: Status: ACTIVE | Noted: 2022-01-01

## 2022-08-25 PROBLEM — E11.65 DIABETES MELLITUS WITH HYPERGLYCEMIA (HCC): Status: ACTIVE | Noted: 2022-01-01

## 2022-08-25 PROBLEM — R64 CACHEXIA (HCC): Status: ACTIVE | Noted: 2022-01-01

## 2022-08-25 PROBLEM — R73.9 HYPERGLYCEMIA: Status: ACTIVE | Noted: 2022-01-01

## 2022-08-25 PROBLEM — R41.89 UNRESPONSIVE: Status: ACTIVE | Noted: 2022-01-01

## 2022-08-25 PROBLEM — N17.9 ACUTE RENAL FAILURE (ARF) (HCC): Status: ACTIVE | Noted: 2022-01-01

## 2022-08-25 PROBLEM — J18.9 PNEUMONIA: Status: ACTIVE | Noted: 2022-01-01

## 2022-08-25 PROBLEM — N19 RENAL FAILURE: Status: ACTIVE | Noted: 2022-01-01

## 2022-08-25 PROBLEM — R09.02 HYPOXIA: Status: ACTIVE | Noted: 2022-01-01

## 2022-08-25 PROBLEM — I42.9 CARDIOMYOPATHY (HCC): Status: ACTIVE | Noted: 2022-01-01

## 2022-08-25 NOTE — WOUND CARE
Wound care Nurse in to assess patient for multiple pressure injuries. Patient had 27 pressure injuries at admission of varying severity from stage 2 to unstageable and Deep tissue injuries. Full assessment of each wound to follow.

## 2022-08-25 NOTE — ED PROVIDER NOTES
59-year-old male history of chronic kidney disease, cirrhosis, diabetes hypertension presents emergency department from [address] [place] [address] unresponsive hypotensive. Patient arrived in overt extremis and critically ill in distress. He is a full code this was confirmed with his guardian a [facility] services       Past Medical History:   Diagnosis Date    Chronic kidney disease     Cirrhosis of liver ([facility]     Decubitus skin ulcer     Diabetes ([facility]     HTN (hypertension)     Muscle weakness     Polyneuropathy        History reviewed. No pertinent surgical history. History reviewed. No pertinent family history. Social History     Socioeconomic History    Marital status:      Spouse name: Not on file    Number of children: Not on file    Years of education: Not on file    Highest education level: Not on file   Occupational History    Not on file   Tobacco Use    Smoking status: Not on file    Smokeless tobacco: Not on file   Substance and Sexual Activity    Alcohol use: Not on file    Drug use: Not on file    Sexual activity: Not on file   Other Topics Concern    Not on file   Social History Narrative    Not on file     Social Determinants of Health     Financial Resource Strain: Not on file   Food Insecurity: Not on file   Transportation Needs: Not on file   Physical Activity: Not on file   Stress: Not on file   Social Connections: Not on file   Intimate Partner Violence: Not on file   Housing Stability: Not on file         ALLERGIES: Patient has no known allergies. Review of Systems   Unable to perform ROS: Mental status change     Vitals:    08/25/22 1056 08/25/22 1103 08/25/22 1113 08/25/22 1117   BP: 128/60 124/62 (!) 66/41    Pulse:  94 97 88   Resp:   15 18   Temp:       SpO2:  (!) 88% 92% (!) 86%   Weight:       Height:                Physical Exam  Constitutional:       General: He is in acute distress. Appearance: He is ill-appearing and toxic-appearing.    HENT:      Head: Normocephalic and atraumatic. Eyes:      General: Scleral icterus present. Extraocular Movements:      Right eye: Normal extraocular motion and no nystagmus. Left eye: Normal extraocular motion and no nystagmus. Pupils: Pupils are equal, round, and reactive to light. Neck:      Meningeal: Brudzinski's sign and Kernig's sign absent. Cardiovascular:      Rate and Rhythm: Bradycardia present. Heart sounds: Murmur heard. No friction rub. No gallop. Pulmonary:      Effort: Respiratory distress present. Breath sounds: No stridor. Rhonchi present. No wheezing or rales. Chest:      Chest wall: No tenderness. Abdominal:      General: Bowel sounds are normal.      Palpations: There is no mass. Musculoskeletal:         General: No swelling or tenderness. Cervical back: Normal range of motion and neck supple. No rigidity. Lymphadenopathy:      Cervical: No cervical adenopathy. Skin:     Capillary Refill: Capillary refill takes 2 to 3 seconds. Coloration: Skin is pale. Findings: No erythema or rash. Neurological:      Mental Status: He is unresponsive. GCS: GCS eye subscore is 2. GCS verbal subscore is 1. GCS motor subscore is 3. MDM  Number of Diagnoses or Management Options  Diagnosis management comments: 71-year-old male presents to the emergency room from nursing facility critically ill in acute distress. Hypoxic on arrival into the 70s on room air unresponsive except to pain which patient withdraws from. Hypotensive down to 70 palp patient was not seen immediately on arrival.  Multiple peripheral lines were started. Peripheral Levophed was started on arrival as patient's blood pressure was not compatible with life and in order to intubate this patient medication that would further drop the blood pressure was needed to be given.   Lungs show rhonchorous noises bilateral abdomen is soft nontender poor peripheral pulses in all extremities but they are present. Patient was maxed out on Levophed and had little improvement in blood pressure. He was given push dose phenylephrine to raise blood pressure which again also had little effect. He finally given push dose epinephrine to raise blood pressure and blood pressure was sufficiently raised enough to intubate patient 1 attempt RSI. See separate note. As patient had the most response to epinephrine there is some possible worry for adrenal insufficiency patient was given Solu-Medrol and epinephrine drip was started in addition to Levophed. Chest x-ray confirmed tube placement there was multifocal pneumonia and infiltrates across the lung fields broad-spectrum antibiotics were started. Central line was placed in the right IJ. History shows CKD hypertension hyperlipidemia CHF. Patient is a full code this was confirmed [facility]. It took quite a bit of critical interventions have patient's blood pressure raise enough for transport to CT CT the head chest abdomen was done. I discussed this case with   who will consult and see patient in the MICU. Patient was discussed with the admitting hospital Dr. Jessica Ocampo.  Pt admitted to MICU             Critical Care    Date/Time: 8/25/2022 11:25 AM  Performed by: Richy Corral MD  Authorized by: Richy Corral MD     Critical care provider statement:     Critical care time (minutes):  80    Critical care time was exclusive of:  Separately billable procedures and treating other patients and teaching time    Critical care was necessary to treat or prevent imminent or life-threatening deterioration of the following conditions:  Sepsis, shock, circulatory failure, cardiac failure, metabolic crisis and endocrine crisis    Critical care was time spent personally by me on the following activities:  Blood draw for specimens, development of treatment plan with patient or surrogate, discussions with primary provider, discussions with consultants, evaluation of patient's response to treatment, interpretation of cardiac output measurements, obtaining history from patient or surrogate, examination of patient, ordering and performing treatments and interventions, ordering and review of laboratory studies, ordering and review of radiographic studies, pulse oximetry, re-evaluation of patient's condition, review of old charts and ventilator management  Intubation    Date/Time: 8/25/2022 11:25 AM  Performed by: Veronica Caal MD  Authorized by: Veronica Caal MD     Consent:     Consent obtained:  Emergent situation    Consent given by:  Guardian    Risks, benefits, and alternatives were discussed: yes      Risks discussed:  Aspiration, brain injury, hypoxia, death and bleeding    Alternatives discussed:  No treatment  Universal protocol:     Procedure explained and questions answered to patient or proxy's satisfaction: yes    Pre-procedure details:     Indication: failure to oxygenate, failure to protect airway, failure to ventilate and predicted clinical deterioration      Patient status:  Altered mental status    Mouth opening - incisor distance:  2 finger widths    Hyoid-mental distance: 3 or more finger widths      Hyoid-thyroid distance: 2 or more finger widths      Mallampati score:  II    Obstruction: none      Neck mobility: reduced      Pharmacologic strategy: RSI      Induction agents:  Ketamine    Paralytics:  Rocuronium  Procedure details:     Preoxygenation:  Bag valve mask    CPR in progress: no      Intubation method:  Oral    Intubation technique: video assisted      Laryngoscope blade:   Mac 3    Bougie used: no      Grade view: II      Tube size (mm):  7.0    Tube type:  Cuffed    Number of attempts:  1    Tube visualized through cords: yes    Placement assessment:     ETT at teeth/gumline (cm):  23    Tube secured with:  ETT castillo    Breath sounds:  Equal    Placement verification: chest rise, colorimetric ETCO2, CXR verification, direct visualization, equal breath sounds and tube exhalation      CXR findings:  Appropriate position  Post-procedure details:     Procedure completion:  Tolerated  Central Line    Date/Time: 8/25/2022 11:26 AM  Performed by: Galileo Lee MD  Authorized by: Galileo Lee MD     Consent:     Consent obtained:  Emergent situation  Universal protocol:     Patient identity confirmed:  Arm band and hospital-assigned identification number  Pre-procedure details:     Indication(s): central venous access and insufficient peripheral access      Hand hygiene: Hand hygiene performed prior to insertion      Sterile barrier technique:  All elements of maximal sterile technique followed      Skin preparation:  Chlorhexidine  Sedation:     Sedation type:  None  Anesthesia:     Anesthesia method:  None  Procedure details:     Location:  R internal jugular    Patient position:  Supine    Procedural supplies:  Triple lumen    Catheter size:  7 Fr    Landmarks identified: yes      Ultrasound guidance: yes      Ultrasound guidance timing: real time      Sterile ultrasound techniques: Sterile gel and sterile probe covers were used      Number of attempts:  1    Successful placement: yes    Post-procedure details:     Post-procedure:  Dressing applied and line sutured    Assessment:  Blood return through all ports, no pneumothorax on x-ray, placement verified by x-ray and free fluid flow    Procedure completion:  Tolerated

## 2022-08-25 NOTE — PROGRESS NOTES
[facility] Pharmacokinetic Monitoring Service - Vancomycin     Melvi De La Vega is a 71 y.o. male starting on vancomycin therapy for HCAP. Pharmacy consulted by Dr. Dee Neville for monitoring and adjustment. Target Concentration: Dosing based on anticipated concentration <15 mg/L due to renal impairment/insufficiency    Additional Antimicrobials:   Zosyn  / Levofloxacin / Clindamycin    Pertinent Laboratory Values: Wt Readings from Last 1 Encounters:   08/25/22 43.5 kg (96 lb)     Temp Readings from Last 1 Encounters:   08/25/22 (!) 94.5 °F (34.7 °C)     No components found for: PROCAL  Estimated Creatinine Clearance: 9 mL/min (A) (based on SCr of 4.61 mg/dL (H)).   Recent Labs     08/25/22  1210   WBC 9.6         Plan:  Concentration-guided dosing due to renal impairment/insufficiency  Start Vancomycin 1000 mg IV once, administered 8/25/22 at 13:04  (in ED)  Renal labs as indicated   Vancomycin concentration ordered for 8/26/22 @ 13:00   Pharmacy will continue to monitor patient and adjust therapy as indicated    Thank you for the consult,  BASIA Roman  8/25/2022 1:35 PM

## 2022-08-25 NOTE — PROGRESS NOTES
RESPIRATORY NOTE:        Pt intubated in ED without incident and placed on ventilator 100% FIO2 7.0 ETT 23 cm at the lip, X-RAY and follow up ABG to follow, will continue to monitor.

## 2022-08-25 NOTE — PROGRESS NOTES
Zosyn (Piperacillin/Tazobactam) Extended Infusion    Jax Medina, a 71 y.o. yo male, has been converted to an extended infusion of Zosyn while in the intensive care unit. A loading dose 4.5 gm was given over 30 minutes. Indication: HCAP    Extended infusions will begin 4 hours after the initial dose if CrCl  >/= 20 ml/min or 8 hours after the initial dose if CrCl < 20 ml/min. Extended infusions will run over 4 hours (240 minutes). Dose adjusted to:  Zosyn 4.5 grams IV q12h      Recent Labs     08/25/22  1210   CREA 4.61*     Ht Readings from Last 1 Encounters:   08/25/22 175.3 cm (69\")     Wt Readings from Last 1 Encounters:   08/25/22 43.5 kg (96 lb)       CrCl : Serum creatinine: 4.61 mg/dL (H) 08/25/22 1210  Estimated creatinine clearance: 9 mL/min (A)    Renal adjustment of extended infusion of Zosyn  3.375 or 4.5 gm every 8 hours for CrCl >/= 20 ml/min  3.375 or 4.5 gm every 12 hours for CrCl < 20 ml/min, intermittent HD or PD    Pharmacy to continue to monitor daily.    Kvng Campbell, PharmD       [phone]

## 2022-08-25 NOTE — CONSULTS
[facility]  Pulmonary, Critical Care, and Sleep Medicine    Name: Ameena Campbell MRN: [id number]   : 1950 Hospital: [facility]    Date: 2022  Room: [address] Note                                              Consult requesting physician: Dr. Narayan Jovel  Reason for Consult: Respiratory failure, intubated, unresponsive      IMPRESSION:   Acute respiratory failure with hypoxemia  Septic shock  Acute metabolic encephalopathy  HCAP  Acute renal failure  Cardiomyopathy  Cachexia  Anemia  Diabetes with hyperglycemia  Severe malnutrion  Bed bound state    Patient Active Problem List   Diagnosis Code    Shock ([address] R57.9    HCAP (healthcare-associated pneumonia) J18.9    Acute renal failure (ARF) ([address] N17.9    Hyperglycemia R73.9    Hypoxia R09.02    Sepsis ([address] A41.9    Unresponsive R41.89    Diabetes mellitus with hyperglycemia ([address] E11.65    Acute respiratory failure with hypoxia ([address] J96.01    Bedbound Z74.01    Cachexia ([address] R64    Severe protein-calorie malnutrition ([address] E43    Cardiomyopathy ([address] I42.9       Code status: Full Code      RECOMMENDATIONS:   Respiratory: Patient intubated. VCV mode of ventilation. CT chest shows bilateral airspace disease consistent with multifocal pneumonia, and dense left lower lobe consolidation. Sedation-currently none. Ventilator bundles ordered. Keep SPO2 >=92%. HOB 30 degree elevation all the time. Aggressive pulmonary toileting. Aspiration precautions. CVS: Patient in septic shock; known EF of 30%. Multiple pressors-Levophed and epinephrine; add vasopressin. Check echocardiogram.  ID: Spectrum antibiotics-levofloxacin, IV vancomycin and Zosyn. Lactic acid not elevated; continue to monitor. Fluids and albumin added. Check respiratory viral PCR including SARS-CoV-2; follow blood cultures; check UA, urine culture and respiratory cultures. Deescalate antibiotic when appropriate.   Hematology/Oncology: Monitor hemoglobin and platelets. No active bleeding issues. Renal: Monitor renal function and urine output. CT abdomen-no hydronephrosis. GI: CT abdomen-nil acute. PPI Prophylaxis. Endocrine: Monitor BS. SSI. Neurology: Encephalopathy; CT head-nil acute. Baseline mentation not known. Skin/Wound: Multiple ulcers; wound care consult. Electrolytes: Replace electrolytes per ICU electrolyte replacement protocol. IVF: NS 75ml/hr; albumin q6. Nutrition: NPO for now. Prophylaxis: DVT Prophylaxis: SCD. GI Prophylaxis: Protonix. Restraints: none  Lines/Tubes: PIVs  ETT: 8/25. OGT/NGT: 8/25. Central line: RT IJ line 8/25 (site examined, no erythema, induration, discharge or sign of infection. Dressing intact. Medically necessary, will remove it when not needed. Central line bundle followed). Morrison catheter insertion; medically necessary in critically ill patient. Advance Directive/Palliative Care: consulted; poor prognosis; patient appears terminally ill. Quality Care: PPI, DVT prophylaxis, HOB elevated, Infection control all reviewed and addressed. High complexity decision making was performed during the evaluation of this patient at high risk for decompensation with multiple organ involvement. Total critical care time spent rendering care exclusive of procedures/family discussion/coordination of care: 48 minutes. [address] [place] [address]   [phone]   I have called and updated patient's guardian; discussed about patient's critically ill condition and multiorgan failure; patient currently on ventilator support on multiple pressors; prognosis is poor; patient is terminally ill. Subjective/History of Present Illness:     Patient is a 71 y.o. male with PMHx significant for CKD, cirrhosis, diabetes hypertension, cardiomyopathy, mitral stenosis, Covid infection June 2022- lives in [address] [place] [address]. Patient was sent to ER unresponsive. He was found to be in shock state. Patient had central line placed in ER and started on pressors. He was also intubated in ER. Patient seen in ER. He is intubated. He is cachetic. No reports of hemoptysis. No vomiting or diarrhea. Full code confirmed by ER team with his guardian at [facility]. Review of Systems:  ROS not obtained due to patient factor. No Known Allergies   Past Medical History:   Diagnosis Date    Chronic kidney disease     Cirrhosis of liver [address]     Decubitus skin ulcer     Diabetes ([facility]     HTN (hypertension)     Muscle weakness     Polyneuropathy       History reviewed. No pertinent surgical history. Social History     Tobacco Use    Smoking status: Not on file    Smokeless tobacco: Not on file   Substance Use Topics    Alcohol use: Not on file      History reviewed. No pertinent family history.    Prior to Admission medications    Not on File     Current Facility-Administered Medications   Medication Dose Route Frequency    [Held by provider] NOREPINephrine (LEVOPHED) 8 mg in 5% dextrose 250mL (32 mcg/mL) infusion  2-16 mcg/min IntraVENous TITRATE    EPINEPHrine (ADRENALIN) 4 mg in 0.9% sodium chloride 250 mL infusion  1-10 mcg/min IntraVENous TITRATE    midazolam in normal saline (VERSED) 1 mg/mL infusion  0-10 mg/hr IntraVENous TITRATE    fentaNYL (PF) 900 mcg/30 ml infusion soln  0-200 mcg/hr IntraVENous TITRATE    piperacillin-tazobactam (ZOSYN) 4.5 g in 0.9% sodium chloride (MBP/ADV) 100 mL MBP  4.5 g IntraVENous Q6H    levoFLOXacin (LEVAQUIN) 750 mg in D5W IVPB  750 mg IntraVENous Q24H    vancomycin (VANCOCIN) 1,000 mg in 0.9% sodium chloride 250 mL (VIAL-MATE)  1,000 mg IntraVENous ONCE    clindamycin (CLEOCIN) 600mg D5W 50mL IVPB (premix)  600 mg IntraVENous Q8H    Vancomycin - Pharmacy to Dose  1 Each Other Rx Dosing/Monitoring    methylPREDNISolone (PF) (Solu-MEDROL) injection 125 mg  125 mg IntraVENous ONCE    NOREPINephrine (LEVOPHED) 8,000 mcg in dextrose 5% 250 mL infusion  2-16 mcg/min IntraVENous TITRATE    NOREPINephrine (LEVOPHED) 8 mg/250 mL (32 mcg/mL) in dextrose 5% 250 mL (32 mcg/mL) infusion        sodium chloride 0.9 % bolus infusion 500 mL  500 mL IntraVENous ONCE    albumin human 25% (BUMINATE) solution 12.5 g  12.5 g IntraVENous Q6H         Objective:   Vital Signs:    Visit Vitals  BP (!) 55/37   Pulse 68   Temp (!) 95.2 °F (35.1 °C)   Resp 18   Ht 5' 9\" (1.753 m)   Wt 43.5 kg (96 lb)   SpO2 (!) 88%   BMI 14.18 kg/m²       O2 Device: Ventilator       Temp (24hrs), Av.3 °F (35.2 °C), Min:95.2 °F (35.1 °C), Max:95.3 °F (35.2 °C)       Intake/Output:   Last shift:       0701 -  1900  In: 18.5 [I.V.:18.5]  Out: -     Last 3 shifts: No intake/output data recorded.       Intake/Output Summary (Last 24 hours) at 2022 1241  Last data filed at 2022 1127  Gross per 24 hour   Intake 18.47 ml   Output --   Net 18.47 ml       Last 3 Recorded Weights in this Encounter    22 0945   Weight: 43.5 kg (96 lb)         Ventilator Settings:  Mode Rate Tidal Volume Pressure FiO2 PEEP   Assist control   450 ml    100 % 6 cm H20     Peak airway pressure: 24 cm H2O    Plateau pressure:     Tidal volume:    Minute ventilation:       Recent Labs     22  1133 22  1001   PHI 7.34* 7.40   PCO2I 35.5 30.3*   PO2I 47* 45*   HCO3I 19.3* 19.1*   FIO2I 100 100       Physical Exam:   Intubated and unresponsive; cachetic; acyanotic  HEENT: pupils not dilated, reactive, no scleral jaundice, moist oral mucosa, no nasal drainage  Neck: No adenopathy or thyroid swelling  Orotracheal and orogastric tubes-no bleeding or secretions  CVS: S1S2 no murmurs; JVD not elevated; telemetry-sinus rhythm  RS: Mod air entry bilaterally, decreased BS at bases, no wheezes, bilateral crackles; not tachypneic or in distress  Abd: soft, non tender, no hepatosplenomegaly, no abd distension, no guarding or rigidity, bowel sounds heard  Neuro: Intubated and unresponsive; limited exam   Extrm: Mild bilateral leg edema   Skin: decub ulcers and bilateral leg/heel ulcers  Lymphatic: no cervical or supraclavicular adenopathy  Vasc: DPs palpable         Data:       Recent Results (from the past 24 hour(s))   EKG, 12 LEAD, INITIAL    Collection Time: 08/25/22  9:46 AM   Result Value Ref Range    Ventricular Rate 86 BPM    Atrial Rate 86 BPM    P-R Interval 168 ms    QRS Duration 148 ms    Q-T Interval 428 ms    QTC Calculation (Bezet) 512 ms    Calculated P Axis 88 degrees    Calculated T Axis 120 degrees    Diagnosis       Normal sinus rhythm  Right atrial enlargement  Left bundle branch block  Abnormal ECG  No previous ECGs available     BLOOD GAS,LACTIC ACID, POC    Collection Time: 08/25/22 10:01 AM   Result Value Ref Range    Device: Non rebreather      FIO2 (POC) 100 %    pH (POC) 7.40 7.35 - 7.45      pCO2 (POC) 30.3 (L) 35.0 - 45.0 MMHG    pO2 (POC) 45 (LL) 80 - 100 MMHG    HCO3 (POC) 19.1 (L) 22 - 26 MMOL/L    sO2 (POC) 84.8 (L) 92 - 97 %    Base deficit (POC) 5.3 mmol/L    Allens test (POC) Positive      Site LEFT BRACHIAL      Patient temp.  96      Specimen type (POC) ARTERIAL      Performed by María Mchugh     Lactic Acid (POC) 1.95 0.40 - 2.00 mmol/L   COVID-19 RAPID TEST    Collection Time: 08/25/22 10:24 AM   Result Value Ref Range    Specimen source Nasopharyngeal      COVID-19 rapid test Not detected NOTD     INFLUENZA A & B AG (RAPID TEST)    Collection Time: 08/25/22 10:24 AM   Result Value Ref Range    Influenza A Antigen Negative NEG      Influenza B Antigen Negative NEG     BLOOD GAS, ARTERIAL POC    Collection Time: 08/25/22 11:33 AM   Result Value Ref Range    Device: ADULT VENT      FIO2 (POC) 100 %    pH (POC) 7.34 (L) 7.35 - 7.45      pCO2 (POC) 35.5 35.0 - 45.0 MMHG    pO2 (POC) 47 (LL) 80 - 100 MMHG    HCO3 (POC) 19.3 (L) 22 - 26 MMOL/L    sO2 (POC) 83.9 (L) 92 - 97 %    Base deficit (POC) 6.4 mmol/L    Mode ASSIST CONTROL      Tidal volume 450 ml    Set Rate 18 bpm    PEEP/CPAP (POC) 6 cmH2O    PIP (POC) 24      Allens test (POC) Positive      Site LEFT BRACHIAL      Patient temp. 96      Specimen type (POC) ARTERIAL      Performed by Yolanda Walker    CBC WITH AUTOMATED DIFF    Collection Time: 08/25/22 12:10 PM   Result Value Ref Range    WBC 9.6 4.6 - 13.2 K/uL    RBC 3.80 (L) 4.35 - 5.65 M/uL    HGB 9.9 (L) 13.0 - 16.0 g/dL    HCT 29.3 (L) 36.0 - 48.0 %    MCV 77.1 (L) 78.0 - 100.0 FL    MCH 26.1 24.0 - 34.0 PG    MCHC 33.8 31.0 - 37.0 g/dL    RDW 14.1 11.6 - 14.5 %    PLATELET 087 936 - 034 K/uL    MPV 10.7 9.2 - 11.8 FL    NRBC 0.0 0  WBC    ABSOLUTE NRBC 0.00 0.00 - 0.01 K/uL    NEUTROPHILS PENDING %    LYMPHOCYTES PENDING %    MONOCYTES PENDING %    EOSINOPHILS PENDING %    BASOPHILS PENDING %    IMMATURE GRANULOCYTES PENDING %    ABS. NEUTROPHILS PENDING K/UL    ABS. LYMPHOCYTES PENDING K/UL    ABS. MONOCYTES PENDING K/UL    ABS. EOSINOPHILS PENDING K/UL    ABS. BASOPHILS PENDING K/UL    ABS. IMM. GRANS. PENDING K/UL    DF PENDING          Chemistry No results for input(s): GLU, NA, K, CL, CO2, BUN, CREA, CA, MG, PHOS, AGAP, BUCR, TBIL, AP, TP, ALB, GLOB, AGRAT in the last 72 hours. No lab exists for component: GPT     Lactic Acid No results found for: LAC  No results for input(s): LAC in the last 72 hours. Liver Enzymes No results found for: TP, ALB, GLOB, AGRAT, AP, TBIL  No results for input(s): TP, ALB, GLOB, AGRAT, AP, TBIL in the last 72 hours. No lab exists for component: SGOT, GPT, DBIL     CBC w/Diff Recent Labs     08/25/22  1210   WBC 9.6   RBC 3.80*   HGB 9.9*   HCT 29.3*      GRANS PENDING   LYMPH PENDING   EOS PENDING        Cardiac Enzymes No results found for: CPK, CK, CKMMB, CKMB, RCK3, CKMBT, CKNDX, CKND1, CAROLYN, TROPT, TROIQ, RONALD, TROPT, TNIPOC, BNP, BNPP     BNP No results found for: BNP, BNPP, XBNPT     Coagulation No results for input(s): PTP, INR, APTT, INREXT in the last 72 hours.       Thyroid  No results found for: T4, T3U, TSH, TSHEXT    No results found for: T4     Urinalysis No results found for: COLOR, APPRN, SPGRU, REFSG, NASRA, PROTU, GLUCU, KETU, BILU, UROU, RENAN, LEUKU, GLUKE, EPSU, BACTU, WBCU, RBCU, CASTS, UCRY     Micro  No results for input(s): SDES, CULT in the last 72 hours. No results for input(s): CULT in the last 72 hours. Culture data during this hospitalization. All Micro Results       Procedure Component Value Units Date/Time    INFLUENZA A & B AG (RAPID TEST) [[id number]] Collected: 08/25/22 1024    Order Status: Completed Specimen: Nasopharyngeal from Nasal washing Updated: 08/25/22 1104     Influenza A Antigen Negative        Comment: A negative result does not exclude influenza virus infection, seasonal or H1N1 due to suboptimal sensitivity. If influenza is circulating in your community, a diagnosis of influenza should be considered based on a patients clinical presentation and empiric antiviral treatment should be considered, if indicated. Influenza B Antigen Negative       COVID-19 RAPID TEST [[id number]] Collected: 08/25/22 1024    Order Status: Completed Specimen: Nasopharyngeal Updated: 08/25/22 1103     Specimen source Nasopharyngeal        COVID-19 rapid test Not detected        Comment: Rapid Abbott ID Now       Rapid NAAT:  The specimen is NEGATIVE for SARS-CoV-2, the novel coronavirus associated with COVID-19. Negative results should be treated as presumptive and, if inconsistent with clinical signs and symptoms or necessary for patient management, should be tested with an alternative molecular assay. Negative results do not preclude SARS-CoV-2 infection and should not be used as the sole basis for patient management decisions. This test has been authorized by the FDA under an Emergency Use Authorization (EUA) for use by authorized laboratories.    Fact sheet for Healthcare Providers: ConventionUpdate.co.nz  Fact sheet for Patients: ConventionUpdate.co.nz Methodology: Isothermal Nucleic Acid Amplification         CULTURE, BLOOD [[id number]] Collected: 08/25/22 0945    Order Status: Sent Specimen: Blood Updated: 08/25/22 1014    CULTURE, BLOOD [[id number]] Collected: 08/25/22 0945    Order Status: Sent Specimen: Blood Updated: 08/25/22 1013             ECHO 6/29/2022   [facility] CONCLUSIONS     * Left ventricular systolic function is moderately reduced with an ejection   fraction of 30 % by visual estimation. * Moderate global hypokinesis of the left ventricle. * Left ventricular diastolic function: Grade II diastolic dysfunction. * There is moderately increased left ventricular wall thickness. * Right ventricular fractional area change is normal, 39 %. * Normal RV size and systolic function. * Dilated LA. * Mitral valve has heavy annular and moderate leaflet calcification with   limited motion of the posterior leaflet. * The mitral valve mean gradient is 4 mmHg. * There is mild mitral valve regurgitation. * No pulmonary hypertension, estimated pulmonary arterial systolic pressure   is 16 mmHg. Comparison     * Compared to prior study from 6/23/20: EF was 25% and MS is new. Images report reviewed by me:  CT (Most Recent)  Results from Hospital Encounter encounter on 08/25/22    CT CHEST ABD PELV WO CONT    Narrative  EXAM: CT chest, abdomen, and pelvis    INDICATION: Altered mental status, sepsis, multifocal pneumonia. COMPARISON: Several prior radiographs obtained previously the same day. No prior  CT imaging available for review. TECHNIQUE: Axial CT imaging of the chest, abdomen, and pelvis was performed  without contrast administration. Multiplanar reformats were generated. One or more dose reduction techniques were used on this CT: automated exposure  control, adjustment of the mAs and/or kVp according to patient size, and  iterative reconstruction techniques.   The specific techniques used on this CT  exam have been documented in the patient's electronic medical record. Digital  Imaging and Communications in Medicine (DICOM) format image data are available  to nonaffiliated external healthcare facilities or entities on a secure, media  free, reciprocally searchable basis with patient authorization for at least a  12-month period after this study. _______________    FINDINGS:    CHEST:    MEDIASTINUM: Right IJ central venous catheter tip present in the distal IVC. Normal cardiac size. No pericardial effusion. Thoracic aorta is diffusely  atherosclerotic but of normal course/caliber. LYMPH NODES: No pathologically enlarged mediastinal or hilar lymph nodes. PLEURA: Trace left effusion. LUNGS/AIRWAY: Endotracheal tube appropriately positioned within the  intrathoracic trachea. Multifocal areas of groundglass opacity are noted,  greatest in conspicuity across the left upper and right upper lobes with  additional peribronchial consolidation. Dense consolidation throughout the  lingula and left lower lobe. OTHER: None. ABDOMEN/PELVIS:    LIVER, BILIARY: Scattered granulomata, otherwise unremarkable. No abnormal  biliary dilation. Cholecystectomy. PANCREAS: Unremarkable. SPLEEN: Unremarkable. ADRENALS: Unremarkable. KIDNEYS: No hydronephrosis. No nephrolithiasis. LYMPH NODES: No pathologically enlarged lymph nodes. GASTROINTESTINAL TRACT: No abnormal bowel dilation or wall thickening. No bowel  obstruction. No free intraperitoneal gas. Normal appendix. Gastric tube  appropriately positioned within the stomach. PELVIC ORGANS: Urinary bladder is thick-walled, contains small foci of  intravesicular gas, and is decompressed with a Morrison catheter in situ. VASCULATURE: Diffuse atherosclerosis without aneurysm    OSSEOUS: Grade 1 anterolisthesis L4 on L5 with advanced same level spondylosis  and facet arthropathy. OTHER: None.    _______________    Impression  1.   Within the chest:  > Endotracheal tube and right IJV central venous catheter both appropriately  positioned.  > Multifocal pneumonia with dense consolidation throughout the left lower  lobe. Trace left effusion. 2. Within the abdomen/pelvis:  > No abnormal bowel wall thickening or inflammatory change.  > No hydronephrosis. Urinary bladder decompressed with Morrison catheter in situ. Small foci of nondependent gas within the bladder lumen likely secondary to  indwelling catheter. CT head  IMPRESSION   1. No acute intracranial abnormality. 2. Subcortical and periventricular white matter low-attenuation compatible with  sequela of chronic ischemic microvascular change. CXR reviewed by me:  XR (Most Recent). Results from Hospital Encounter encounter on 08/25/22    XR CHEST PORT    Narrative  EXAM: XR CHEST PORT    CLINICAL INDICATION/HISTORY: central line placement  -Additional: None    COMPARISON: Earlier same day    TECHNIQUE: Portable frontal view of the chest    _______________    FINDINGS:    SUPPORT DEVICES: Interval placement right IJV central venous catheter tip at the  cavoatrial junction. Endotracheal tube in the midthoracic trachea. Enteric tube  tip within the stomach. HEART AND MEDIASTINUM: Cardiomediastinal silhouette within normal limits. LUNGS AND PLEURAL SPACES: Bilateral prominent perihilar opacities. Possible  small left effusion. No pneumothorax.    _______________    Impression  Interval placement right IJV central venous catheter tip at the cavoatrial  junction. No pneumothorax. Please note: Voice-recognition software may have been used to generate this report, which may have resulted in some phonetic-based errors in grammar and contents. Even though attempts were made to correct all the mistakes, some may have been missed, and remained in the body of the document.       Marry Mace MD  8/25/2022

## 2022-08-25 NOTE — H&P
History & Physical    Patient: Roberta Garcia MRN: [id number]  CSN: [id number]    YOB: 1950  Age: 71 y.o. Sex: male      DOA: 8/25/2022  Primary Care Provider:  Nilda Aguilar MD      Assessment/Plan     Patient Active Problem List   Diagnosis Code    Shock ([facility] [address] R57.9    HCAP (healthcare-associated pneumonia) J18.9    Acute renal failure (ARF) ([address] N17.9    Hyperglycemia R73.9    Hypoxia R09.02    Sepsis ([address] A41.9    Unresponsive R41.89    Diabetes mellitus with hyperglycemia ([address] E11.65    Acute respiratory failure with hypoxia (HCC) J96.01    Bedbound Z74.01    Cachexia ([address] R64    Severe protein-calorie malnutrition ([address] E43    Cardiomyopathy ([address] I42.9    Pressure ulcer of sacral region L89.159    Pressure injury of contiguous region involving back and left hip, unstageable ([address] L89.45    Pressure ulcer of contiguous region involving back and right hip L89.40       Admit to ICU    CRITICAL CARE PLAN    Resp -   Acute respiratory failure with hypoxia - See vent orders, VAP bundle. HOB>30 degrees. CT chest with Multifocal pneumonia with dense consolidation throughout the left lower lobe. Trace left effusion. ID - Follow up blood and urine cx. Multifocal focal pneumonia  Multiple pressure ulcers  Follow respiratory viral panel  ANTIBIOTICS vancomycin, zosyn, levaquin, clindamycin. Follow lactic acid    [facility] - Monitor HD. Wean pressors,  MAP>65. Septic shock - on levophed, vasopressin, epinephrine. IVF  albumin  Known systolic CHF, EF of 29%. NTproBNP elevated at 2919  Follow echo    Heme/onc - Follow H&H, plts. INR. Renal - Trend BUN, Cr, follow I/O, hutchins in place. Check and replace Mg, K, phos. BEREKET - follow renal function  CT abdomen with no obstruction. Endocrine -    DM with hyperglycemia  On SSI    Neuro/ Pain/ Sedation - . Sedation bundle. CT head with no acute findings    GI - NPO for now. CT abdomen pelvis with no acute findings.     Derm -  Multiple pressure ulcers  Wound care consulted    [phone]  45 minutes of critical care time spent in the direct evaluation and treatment of this high risk patient. The reason for providing this level of medical care for this critically ill patient was due a critical illness that impaired one or more vital organ systems such that there was a high probability of imminent or life threatening deterioration in the patients condition. This care involved high complexity decision making to assess, manipulate, and support vital system functions, to treat this degreee vital organ system failure and to prevent further life threatening deterioration of the patients condition. Prophylaxis - DVT: SCDs     Estimated length of stay : 5 days    CC: unresponsive. HPI:     Ameena Campbell is a 71 y.o. male with cirrhosis, CKD, HTN, cardiomyopathy, mitral stenosis, COVID 23 in June 2022 is sent to ER from [facility] with concerns of unresponsiveness. Patient is orally intubated and sedated. History obtained from chart review. Patient was also noted to be hypotensive. In ER he was noted to be hypothermic with a temperature of 95.3, blood pressure of 70 palpable, respirations of 31, oxygen saturations in low 70s. He was placed on nonrebreather, started on pressors. Subsequently he required to be orally intubated. He was maxed out on Levophed and he was started on epinephrine. His labs showed H&H of 9.9/29.3, INR of 1.3, blood glucose of 421, BUN/creatinine of 189/4.61 lactic acid of 3.2, albumin of 1.2. High-sensitivity troponin at 86, NT proBNP at 2919. His ammonia 31 head CT with no acute findings    Past Medical History:   Diagnosis Date    Chronic kidney disease     Cirrhosis of liver ([facility] [address]     Decubitus skin ulcer     Diabetes ([facility] [address]     HTN (hypertension)     Muscle weakness     Polyneuropathy        History reviewed. No pertinent surgical history. History reviewed. No pertinent family history.     Social History     Socioeconomic History    Marital status:        Prior to Admission medications    Not on File       No Known Allergies    Review of Systems  Unable to obtain          Physical Exam:     Physical Exam:  Visit Vitals  BP (!) 112/58   Pulse 88   Temp 97.3 °F (36.3 °C)   Resp 18   Ht 5' 9\" (1.753 m)   Wt 43.5 kg (96 lb)   SpO2 91%   BMI 14.18 kg/m²      O2 Device: Endotracheal tube, Ventilator    Temp (24hrs), Av.9 °F (35.5 °C), Min:94.5 °F (34.7 °C), Max:97.3 °F (36.3 °C)    701 -  1900  In: 1407.7 [I.V.:1377.7]  Out: 1030 [Urine:30]   No intake/output data recorded. General:  A as above. Head:  Normocephalic, without obvious abnormality, atraumatic. Eyes:  Conjunctivae/corneas clear, sclera anicteric, PERRL,    Nose: Nares normal. No drainage or sinus tenderness. Throat: .    Neck: Supple, symmetrical, trachea midline, no adenopathy. Lungs:   Decreased breath sounds lower lungs bilaterally, crackles bilaterally. Heart:   S1, S2, no murmur, click, rub or gallop. Abdomen: Soft, non-tender. Bowel sounds normal. No masses,  No organomegaly. Extremities: Extremities normal, atraumatic, no cyanosis or edema. Capillary refill normal.   Pulses:    Skin: Skin color pink, turgor normal.  Multiple pressure ulcers involving sacral area, bilateral hips, legs, heels, foot.    Neurologic:        Labs Reviewed:    CMP:   Lab Results   Component Value Date/Time     2022 12:10 PM    K 4.8 2022 12:10 PM     2022 12:10 PM    CO2 22 2022 12:10 PM    AGAP 13 2022 12:10 PM     (HH) 2022 12:10 PM     (H) 2022 12:10 PM    CREA 4.61 (H) 2022 12:10 PM    GFRAA 15 (L) 2022 12:10 PM    GFRNA 13 (L) 2022 12:10 PM    CA 8.7 2022 12:10 PM    ALB 1.2 (L) 2022 12:10 PM    TP 5.8 (L) 2022 12:10 PM    GLOB 4.6 (H) 2022 12:10 PM    AGRAT 0.3 (L) 2022 12:10 PM     (H) 08/25/2022 12:10 PM     CBC:   Lab Results   Component Value Date/Time    WBC 9.6 08/25/2022 12:10 PM    HGB 9.9 (L) 08/25/2022 12:10 PM    HCT 29.3 (L) 08/25/2022 12:10 PM     08/25/2022 12:10 PM     All Cardiac Markers in the last 24 hours: No results found for: CPK, CK, CKMMB, CKMB, RCK3, CKMBT, CKNDX, CKND1, CAROLYN, TROPT, TROIQ, RONALD, TROPT, TNIPOC, BNP, BNPP  ABG:   Lab Results   Component Value Date/Time    PHI 7.34 (L) 08/25/2022 11:33 AM    PCO2I 35.5 08/25/2022 11:33 AM    PO2I 47 (LL) 08/25/2022 11:33 AM    HCO3I 19.3 (L) 08/25/2022 11:33 AM    FIO2I 100 08/25/2022 11:33 AM     COAGS:   Lab Results   Component Value Date/Time    APTT 33.0 08/25/2022 12:10 PM    PTP 16.5 (H) 08/25/2022 12:10 PM    INR 1.3 (H) 08/25/2022 12:10 PM         Procedures/imaging: see electronic medical records for all procedures/Xrays and details which were not copied into this note but were reviewed prior to creation of Plan    Please note that this dictation was completed with [employer], the [employer] voice recognition software. Quite often unanticipated grammatical, syntax, homophones, and other interpretive errors are inadvertently transcribed by the computer software. Please disregard these errors. Please excuse any errors that have escaped final proofreading.         CC: Adrienne Philip MD

## 2022-08-25 NOTE — ED TRIAGE NOTES
Per ems they arrived and glucose 481, sats were 75 placed on NrB up to 91, alert to person only IV established and gave IV fluids and became more responsive.  Ems reports ekg showed elevation in leads 1 and 2 v

## 2022-08-25 NOTE — Clinical Note
Status[de-identified] INPATIENT [101]   Type of Bed: Telemetry [19]   Cardiac Monitoring Required?: Yes   Inpatient Hospitalization Certified Necessary for the Following Reasons: 4.  Patient requires ICU level of care interventions (further clarification in H&P documentation)   Admitting Diagnosis: Sepsis [facility]) [[id number]]   Admitting Diagnosis: Shock [facility]) [[id number]]   Admitting Diagnosis: Unresponsive [7122267]   Admitting Diagnosis: Hypoxia [463173]   Admitting Diagnosis: Hyperglycemia [014625]   Admitting Diagnosis: Renal failure [704850]   Admitting Diagnosis: Pneumonia [199277]   Admitting Physician: Byron Reid   Attending Physician: Byron Reid   Estimated Length of Stay: 7+ Midnights   Discharge Plan[de-identified] Extended Care Facility (e.g. Adult Home, Nursing Home, etc.)

## 2022-08-25 NOTE — PROGRESS NOTES
Pharmacy Renal Dosing Services:     Levofloxacin was automatically dose-adjusted per [facility] P&T Committee Protocol, with respect to renal function. Consult provided for this   71 y.o. , male , for the indication of HCAP  Initial dose: Levofloxacin 750 mg IV dose administered 8/25/22  Dose adjusted to:   Levofloxacin 500 mg IV q48h    Pt Weight:   Wt Readings from Last 1 Encounters:   08/25/22 43.5 kg (96 lb)     Previous Regimen      Levofloxacin 750 mg IV q24h   Serum Creatinine Lab Results   Component Value Date/Time    Creatinine 4.61 (H) 08/25/2022 12:10 PM       Creatinine Clearance Estimated Creatinine Clearance: 9 mL/min (A) (based on SCr of 4.61 mg/dL (H)). BUN Lab Results   Component Value Date/Time     (H) 08/25/2022 12:10 PM         Pharmacy to continue to monitor patient daily. Will make dosage adjustments based upon changing renal function.   Signed Ramsey Estevez information:  [phone]

## 2022-08-26 PROBLEM — R78.81 GRAM-NEGATIVE BACTEREMIA: Status: ACTIVE | Noted: 2022-01-01

## 2022-08-26 NOTE — PROGRESS NOTES
Hospitalist Progress Note    Patient: Sean Han MRN: [id number]  CSN: [id number]    YOB: 1950  Age: 71 y.o. Sex: male    DOA: 8/25/2022 LOS:  LOS: 1 day                Assessment/Plan     Patient Active Problem List   Diagnosis Code    Shock ([address] R57.9    HCAP (healthcare-associated pneumonia) J18.9    Acute renal failure (ARF) ([address] N17.9    Hyperglycemia R73.9    Hypoxia R09.02    Sepsis ([address] A41.9    Unresponsive R41.89    Diabetes mellitus with hyperglycemia ([address] E11.65    Acute respiratory failure with hypoxia (HCC) J96.01    Bedbound Z74.01    Cachexia ([address] R64    Severe protein-calorie malnutrition ([address] E43    Cardiomyopathy ([address] I42.9    Pressure ulcer of sacral region L89.159    Pressure injury of contiguous region involving back and left hip, unstageable ([address] L89.45    Pressure ulcer of contiguous region involving back and right hip L89.40    Gram-negative bacteremia R78.81        Chief complaint :  Unresponsive  71 y.o. male with cirrhosis, CKD, HTN, cardiomyopathy, mitral stenosis, COVID 23 in June 2022 is sent to ER from [facility] with concerns of unresponsiveness. CRITICAL CARE PLAN    Orally intubated, sedated. Resp -   Acute respiratory failure with hypoxia - See vent orders, VAP bundle. HOB>30 degrees. CT chest with Multifocal pneumonia with dense consolidation throughout the left lower lobe. Trace left effusion. ID - Follow up blood and urine cx. Bacteremia - blood cultures growing GNR  Multifocal focal pneumonia  Multiple pressure ulcers  Follow respiratory viral panel  ANTIBIOTICS vancomycin, zosyn, levaquin. Follow lactic acid     [facility] - Monitor HD. Wean pressors,  MAP>65. Septic shock - on levophed, vasopressin, epinephrine. IVF  albumin  Echo with EF of 50-55%  NTproBNP elevated at 2919  Follow echo     Heme/onc - Follow H&H, plts. INR. Renal - Trend BUN, Cr, follow I/O, hutchins in place. Check and replace Mg, K, phos.   BEREKET - follow renal function  Hyperkalemia - lokelma  CT abdomen with no obstruction. Endocrine -    DM with hyperglycemia  On SSI     Neuro/ Pain/ Sedation - . Sedation bundle. CT head with no acute findings     GI - NPO for now. CT abdomen pelvis with no acute findings. Derm -  Multiple pressure ulcers  Wound care consulted  See description in chart     Palliative care consulted. [phone]  40 minutes of critical care time spent in the direct evaluation and treatment of this high risk patient. The reason for providing this level of medical care for this critically ill patient was due a critical illness that impaired one or more vital organ systems such that there was a high probability of imminent or life threatening deterioration in the patients condition. This care involved high complexity decision making to assess, manipulate, and support vital system functions, to treat this degreee vital organ system failure and to prevent further life threatening deterioration of the patients condition. Disposition : TBD    Physical Exam:  General: As above    HEENT: NC, Atraumatic. PERRLA, anicteric sclerae. Lungs: CTA Bilaterally. No Wheezing/Rhonchi/Rales. Heart:  S1 S2,  No murmur, No Rubs, No Gallops  Abdomen: Soft, Non distended, Non tender.   +Bowel sounds,   Extremities: No c/c/e  Multiple pressure ulcers as described above      Vital signs/Intake and Output:  Visit Vitals  BP (!) 116/49   Pulse (!) 105   Temp 99.8 °F (37.7 °C)   Resp 30   Ht 5' 9\" (1.753 m)   Wt 43.5 kg (96 lb)   SpO2 (!) 86%   BMI 14.18 kg/m²     Current Shift:  08/26 0701 - 08/26 1900  In: 10   Out: -   Last three shifts:  08/24 1901 - 08/26 0700  In: 3827.7 [I.V.:3797.7]  Out: 1230 [Urine:230]            Labs: Results:       Chemistry Recent Labs     08/26/22  0500 08/25/22  1210   * 421*    136   K 5.2 4.8    101   CO2 18* 22   * 189*   CREA 4.50* 4.61*   CA 8.1* 8.7   AGAP 13 13   BUCR 42* 41*   * 182* TP 6.0* 5.8*   ALB 1.8* 1.2*   GLOB 4.2* 4.6*   AGRAT 0.4* 0.3*      CBC w/Diff Recent Labs     08/26/22  0500 08/25/22  1210   WBC 12.1 9.6   RBC 3.30* 3.80*   HGB 8.5* 9.9*   HCT 25.8* 29.3*    318   GRANS 60 62   LYMPH 3* 4*   EOS 0 0      Cardiac Enzymes No results for input(s): CPK, CKND1, CAROLYN in the last 72 hours. No lab exists for component: CKRMB, TROIP   Coagulation Recent Labs     08/25/22  1210   PTP 16.5*   INR 1.3*   APTT 33.0       Lipid Panel No results found for: CHOL, CHOLPOCT, CHOLX, CHLST, CHOLV, 082498, HDL, HDLP, LDL, LDLC, DLDLP, 184617, VLDLC, VLDL, TGLX, TRIGL, TRIGP, TGLPOCT, CHHD, CHHDX   BNP No results for input(s): BNPP in the last 72 hours.    Liver Enzymes Recent Labs     08/26/22  0500   TP 6.0*   ALB 1.8*   *      Thyroid Studies Lab Results   Component Value Date/Time    TSH 1.86 08/25/2022 12:10 PM        Procedures/imaging: see electronic medical records for all procedures/Xrays and details which were not copied into this note but were reviewed prior to creation of Plan

## 2022-08-26 NOTE — PROGRESS NOTES
[facility]ists  Pulmonary, Critical Care, and Sleep Medicine    Name: Aquiles Keys MRN: [id number]   : 1950 Hospital: [facility] [place]    Date: 2022  Room: [facility]/[address] [place] [address] Note                                              Consult requesting physician: Dr. Rad Cox  Reason for Consult: Respiratory failure, intubated, unresponsive      IMPRESSION:   Acute respiratory failure with hypoxemia  Septic shock  Acute metabolic encephalopathy  HCAP  GNR bacteremia  Acute renal failure  Cardiomyopathy  Cachexia  Anemia  Diabetes with hyperglycemia  Severe malnutrion  Bed bound state    Patient Active Problem List   Diagnosis Code    Shock ([address] R57.9    HCAP (healthcare-associated pneumonia) J18.9    Acute renal failure (ARF) ([address] N17.9    Hyperglycemia R73.9    Hypoxia R09.02    Sepsis ([address] A41.9    Unresponsive R41.89    Diabetes mellitus with hyperglycemia ([address] E11.65    Acute respiratory failure with hypoxia (HCC) J96.01    Bedbound Z74.01    Cachexia ([address] R64    Severe protein-calorie malnutrition ([address] E43    Cardiomyopathy ([address] I42.9    Pressure ulcer of sacral region L89.159    Pressure injury of contiguous region involving back and left hip, unstageable ([address] L89.45    Pressure ulcer of contiguous region involving back and right hip L89.40    Gram-negative bacteremia R78.81       Code status: Full Code      RECOMMENDATIONS:   Respiratory: Patient intubated. VCV mode of ventilation. FiO2 50%/PEEP 5. Check ABG today. Chest x-ray reviewed-ET tube, OG tube, right IJ central line in good positions; bilateral pulmonary consolidations consistent with pneumonia; mild left diaphragm elevation likely chronic. CT chest on admission-bilateral airspace disease consistent with multifocal pneumonia, and dense left lower lobe consolidation. Sedation-Versed 1 mg/h. Ventilator bundles ordered. Keep SPO2 >=92%. HOB 30 degree elevation all the time.  Aggressive pulmonary toileting. Aspiration precautions. Addendum-discussed with RT ABG this morning 100% FiO2-pH 7.31, PCO2 35, PaO2 220. CVS: Patient in septic shock; known EF of 30%. Repeat echocardiogram-improved LV function EF 50-55%; PASP 36 mmHg; no significant mitral stenosis or regurgitation reported. Pressors-Levophed, epinephrine and vasopressin. Systolic blood pressure greater than 95 mmHg, MAP greater than 60 mmHg. ID: Spectrum antibiotics-levofloxacin, IV vancomycin and Zosyn. Lactic acid 1.9 this morning. Respiratory viral PCR including SARS-CoV-2-negative. Influenza A and B-.  Blood cultures-gram-negative rods-follow identification and sensitivities. UA-positive for UTI. Urine and respiratory cultures-pending. Deescalate antibiotic when appropriate. Hematology/Oncology: Monitor hemoglobin and platelets. Hemoglobin trending down-likely dilutional-8.5 today; no active bleeding issues. Platelets normal.  Renal: Monitor renal function; poor urine output; creatinine worsening-4.50 today. Potassium mildly increased-give 1 dose of Lokelma. Monitor electrolytes. CT abdomen-no hydronephrosis. GI: CT abdomen-nil acute. PPI Prophylaxis. [id number]-[id number] due to shock liver. Endocrine: Monitor BS-elevated  Start Lantus insulin-8 units daily; continue SSI. Neurology: Encephalopathy; CT head-nil acute. Baseline mentation not known. Leo Garrett. Skin/Wound: Multiple leg and decubitus ulcers; see wound care consult. Electrolytes: Replace electrolytes per ICU electrolyte replacement protocol. IVF: NS 75ml/hr; albumin q6-continue. Nutrition: Start trickle tube feeding; watch for refeeding syndrome; nutrition consult placed. Prophylaxis: DVT Prophylaxis: SCD. GI Prophylaxis: Protonix. Restraints: none  Lines/Tubes: PIVs  ETT: 8/25. OGT/NGT: 8/25. Central line: RT IJ line 8/25 (site examined, no erythema, induration, discharge or sign of infection. Dressing intact.  Medically necessary, will remove it when not needed. Central line bundle followed). Morrison catheter: 8/25 medically necessary in critically ill patient. Advance Directive/Palliative Care: consulted; poor prognosis; patient appears terminally ill. Discussed with palliative care team; dismal prognosis; patient with multiple baseline comorbidities, appears chronically bedbound, leg and decubitus ulcers at baseline, seems to be in severe septic shock with multiorgan failure; worsening renal failure; patient not an ideal candidate for dialysis due to critically ill state on multiple pressors; recommend consideration for comfort care due to poor prognosis, and risk of prolonged pain and suffering. Quality Care: PPI, DVT prophylaxis, HOB elevated, Infection control all reviewed and addressed. High complexity decision making was performed during the evaluation of this patient at high risk for decompensation with multiple organ involvement. Total critical care time spent rendering care exclusive of procedures/family discussion/coordination of care: 52 minutes    NANETTE ROLLINS Guardian   [phone]   Addendum  I have called and updated patient's guardian; discussed about patient's critically ill condition; poor prognosis; patient appears to be terminally ill and dying; he has multiple comorbidities; he is on pressors; he is in severe shock state; he is in acute renal failure with worsening renal function; he is not a candidate for dialysis. Considering patient's poor underlying baseline condition, bedbound state, living in nursing home, and now critically ill with multiorgan failure, with dismal prognosis-patient is at risk of prolonged pain and suffering with continuing ventilator support; normally, we will discuss with family members and make important decisions; recommended the same consider to guardian services.            Subjective/History of Present Illness:     Patient is a 71 y.o. male with PMHx significant for CKD, cirrhosis, diabetes hypertension, cardiomyopathy, mitral stenosis, Covid infection June 2022- lives in [address] [place] [address]. Patient was sent to ER unresponsive. He was found to be in shock state. Patient had central line placed in ER and started on pressors. He was also intubated in ER.    8/26/2022  Patient seen in [facility]. He is intubated. He is mildly sedated. No vomiting or diarrhea. No bloody secretions reported. Telemetry-sinus rhythm. Blood pressure-remains on pressors. Urine output-poor. He is cachetic. Drips-NS 75 mill per hour, epinephrine 3 mcg/min, Levophed 15 mcg/min, vasopressin 0.03 units/min, midazolam 1 mg/h, fentanyl on hold. Full code confirmed by ER team with his guardian at [facility] home services. Review of Systems:  ROS not obtained due to patient factor. No Known Allergies   Past Medical History:   Diagnosis Date    Chronic kidney disease     Cirrhosis of liver ([facility]     Decubitus skin ulcer     Diabetes ([facility]     HTN (hypertension)     Muscle weakness     Polyneuropathy       History reviewed. No pertinent surgical history. Social History     Tobacco Use    Smoking status: Not on file    Smokeless tobacco: Not on file   Substance Use Topics    Alcohol use: Not on file      History reviewed. No pertinent family history.    Prior to Admission medications    Not on File     Current Facility-Administered Medications   Medication Dose Route Frequency    EPINEPHrine (ADRENALIN) 4 mg in 0.9% sodium chloride 250 mL infusion  1-10 mcg/min IntraVENous TITRATE    midazolam in normal saline (VERSED) 1 mg/mL infusion  0-10 mg/hr IntraVENous TITRATE    fentaNYL (PF) 900 mcg/30 ml infusion soln  0-200 mcg/hr IntraVENous TITRATE    clindamycin (CLEOCIN) 600mg D5W 50mL IVPB (premix)  600 mg IntraVENous Q8H    Vancomycin - Pharmacy to Dose  1 Each Other Rx Dosing/Monitoring    albumin human 25% (BUMINATE) solution 12.5 g  12.5 g IntraVENous Q6H    0.9% sodium chloride infusion  75 mL/hr IntraVENous CONTINUOUS    NOREPINephrine (LEVOPHED) 8 mg in 5% dextrose 250mL (32 mcg/mL) infusion  2-16 mcg/min IntraVENous TITRATE    Vancomycin Random level due 22 at 13:00  1 Each Other ONCE    chlorhexidine (PERIDEX) 0.12 % mouthwash 10 mL  10 mL Oral Q12H    insulin lispro (HUMALOG) injection   SubCUTAneous Q6H    vasopressin (VASOSTRICT) 20 Units in 0.9% sodium chloride 100 mL infusion  0-0.04 Units/min IntraVENous TITRATE    [START ON 2022] levoFLOXacin (LEVAQUIN) 500 mg in D5W IVPB  500 mg IntraVENous Q48H    piperacillin-tazobactam (ZOSYN) 4.5 g in 0.9% sodium chloride (MBP/ADV) 100 mL MBP  4.5 g IntraVENous Q12H         Objective:   Vital Signs:    Visit Vitals  BP (!) 130/51   Pulse 78   Temp 97.3 °F (36.3 °C)   Resp 18   Ht 5' 9\" (1.753 m)   Wt 43.5 kg (96 lb)   SpO2 98%   BMI 14.18 kg/m²       O2 Device: Endotracheal tube, Ventilator       Temp (24hrs), Av.4 °F (36.9 °C), Min:94.5 °F (34.7 °C), Max:101.1 °F (38.4 °C)       Intake/Output:   Last shift:      No intake/output data recorded.     Last 3 shifts:  1901 -  0700  In: 3827.7 [I.V.:3797.7]  Out: 1230 [Urine:230]      Intake/Output Summary (Last 24 hours) at 2022 0815  Last data filed at 2022 0600  Gross per 24 hour   Intake 3827.69 ml   Output 1230 ml   Net 2597.69 ml         Last 3 Recorded Weights in this Encounter    22 0945 22 1500 22 1549   Weight: 43.5 kg (96 lb) 43.6 kg (96 lb 1.9 oz) 43.5 kg (96 lb)         Ventilator Settings:  Mode Rate Tidal Volume Pressure FiO2 PEEP   Assist control   400 ml    50 % (decreased to 50%) 5 cm H20     Peak airway pressure: 9 cm H2O    Plateau pressure:     Tidal volume:    Minute ventilation: 7.8 l/min     Recent Labs     22  1133 22  1001   PHI 7.34* 7.40   PCO2I 35.5 30.3*   PO2I 47* 45*   HCO3I 19.3* 19.1*   FIO2I 100 100         Physical Exam:   Intubated and unresponsive; mildly sedated; cachetic; acyanotic  HEENT: pupils not dilated, reactive, no scleral jaundice, moist oral mucosa, no nasal drainage  Neck: No adenopathy or thyroid swelling  Orotracheal and orogastric tubes-no bleeding or secretions  CVS: Normal heart sounds; S1 and S2 with no murmur; telemetry-sinus rhythm; JVD not elevated   RS: Symmetrical breath sounds; moderate air entry bilateral; no wheezing; mild bibasal crackles; not tachypneic or in distress  Abd: Soft, no tenderness, no distention, no guarding or rigidity; bowel sounds present; no hepatosplenomegaly  Neuro: Intubated and unresponsive; mildly sedated; limited exam   Extrm: Mild bilateral leg edema   Skin: decub ulcers and bilateral leg/heel ulcers - see wound care documentation  Lymphatic: no cervical or supraclavicular adenopathy  Vasc: DPs palpable         Data:       Recent Results (from the past 24 hour(s))   CULTURE, BLOOD    Collection Time: 08/25/22  9:45 AM    Specimen: Blood   Result Value Ref Range    Special Requests: NO SPECIAL REQUESTS      GRAM STAIN GRAM NEGATIVE RODS ANAEROBIC BOTTLE      GRAM STAIN        SMEAR CALLED TO AND CORRECTLY REPEATED BY: Jose Baldwin RN ICU TO [facility] AT [id number]    GRAM STAIN AEROBIC BOTTLE GRAM NEGATIVE RODS      GRAM STAIN        SMEAR CALLED TO AND CORRECTLY REPEATED BY: Arnold Arce RN ICU AT 0110 ON 8/26/22 TO [facility]. Culture result:        Sent to [facility] for ID/Susceptibility if indicated. Culture result: CULTURE IN PROGRESS,FURTHER UPDATES TO FOLLOW     CULTURE, BLOOD    Collection Time: 08/25/22  9:45 AM    Specimen: Blood   Result Value Ref Range    Special Requests: NO SPECIAL REQUESTS      GRAM STAIN GRAM NEGATIVE RODS ANAEROBIC BOTTLE      GRAM STAIN        SMEAR CALLED TO AND CORRECTLY REPEATED BY: Jose Baldwin RN ICU TO [facility] AT 2150 ON [id number]    GRAM STAIN AEROBIC BOTTLE GRAM NEGATIVE RODS      GRAM STAIN        SMEAR CALLED TO AND CORRECTLY REPEATED BY: Arnold Arce RN ICU AT 0110 ON 8/26/22 TO [facility].     Culture result:        Sent to [facility] for ID/Susceptibility if indicated. Culture result: CULTURE IN PROGRESS,FURTHER UPDATES TO FOLLOW     EKG, 12 LEAD, INITIAL    Collection Time: 08/25/22  9:46 AM   Result Value Ref Range    Ventricular Rate 86 BPM    Atrial Rate 86 BPM    P-R Interval 168 ms    QRS Duration 148 ms    Q-T Interval 428 ms    QTC Calculation (Bezet) 512 ms    Calculated P Axis 88 degrees    Calculated T Axis 120 degrees    Diagnosis       Normal sinus rhythm  Right atrial enlargement  Left bundle branch block  Abnormal ECG  No previous ECGs available     BLOOD GAS,LACTIC ACID, POC    Collection Time: 08/25/22 10:01 AM   Result Value Ref Range    Device: Non rebreather      FIO2 (POC) 100 %    pH (POC) 7.40 7.35 - 7.45      pCO2 (POC) 30.3 (L) 35.0 - 45.0 MMHG    pO2 (POC) 45 (LL) 80 - 100 MMHG    HCO3 (POC) 19.1 (L) 22 - 26 MMOL/L    sO2 (POC) 84.8 (L) 92 - 97 %    Base deficit (POC) 5.3 mmol/L    Allens test (POC) Positive      Site LEFT BRACHIAL      Patient temp. 96      Specimen type (POC) ARTERIAL      Performed by Bart Sam     Lactic Acid (POC) 1.95 0.40 - 2.00 mmol/L   COVID-19 RAPID TEST    Collection Time: 08/25/22 10:24 AM   Result Value Ref Range    Specimen source Nasopharyngeal      COVID-19 rapid test Not detected NOTD     INFLUENZA A & B AG (RAPID TEST)    Collection Time: 08/25/22 10:24 AM   Result Value Ref Range    Influenza A Antigen Negative NEG      Influenza B Antigen Negative NEG     BLOOD GAS, ARTERIAL POC    Collection Time: 08/25/22 11:33 AM   Result Value Ref Range    Device: ADULT VENT      FIO2 (POC) 100 %    pH (POC) 7.34 (L) 7.35 - 7.45      pCO2 (POC) 35.5 35.0 - 45.0 MMHG    pO2 (POC) 47 (LL) 80 - 100 MMHG    HCO3 (POC) 19.3 (L) 22 - 26 MMOL/L    sO2 (POC) 83.9 (L) 92 - 97 %    Base deficit (POC) 6.4 mmol/L    Mode ASSIST CONTROL      Tidal volume 450 ml    Set Rate 18 bpm    PEEP/CPAP (POC) 6 cmH2O    PIP (POC) 24      Allens test (POC) Positive      Site LEFT BRACHIAL      Patient temp.  80 Specimen type (POC) ARTERIAL      Performed by Yong Hemphill    PROTHROMBIN TIME + INR    Collection Time: 08/25/22 12:10 PM   Result Value Ref Range    Prothrombin time 16.5 (H) 11.5 - 15.2 sec    INR 1.3 (H) 0.8 - 1.2     CBC WITH AUTOMATED DIFF    Collection Time: 08/25/22 12:10 PM   Result Value Ref Range    WBC 9.6 4.6 - 13.2 K/uL    RBC 3.80 (L) 4.35 - 5.65 M/uL    HGB 9.9 (L) 13.0 - 16.0 g/dL    HCT 29.3 (L) 36.0 - 48.0 %    MCV 77.1 (L) 78.0 - 100.0 FL    MCH 26.1 24.0 - 34.0 PG    MCHC 33.8 31.0 - 37.0 g/dL    RDW 14.1 11.6 - 14.5 %    PLATELET 207 955 - 978 K/uL    MPV 10.7 9.2 - 11.8 FL    NRBC 0.0 0  WBC    ABSOLUTE NRBC 0.00 0.00 - 0.01 K/uL    NEUTROPHILS 62 40 - 73 %    BAND NEUTROPHILS 17 (H) 0 - 5 %    LYMPHOCYTES 4 (L) 21 - 52 %    MONOCYTES 1 (L) 3 - 10 %    EOSINOPHILS 0 0 - 5 %    BASOPHILS 0 0 - 2 %    METAMYELOCYTES 11 (H) 0 %    MYELOCYTES 5 (H) 0 %    IMMATURE GRANULOCYTES 0 %    ABS. NEUTROPHILS 7.6 1.8 - 8.0 K/UL    ABS. LYMPHOCYTES 0.4 (L) 0.9 - 3.6 K/UL    ABS. MONOCYTES 0.1 0.05 - 1.2 K/UL    ABS. EOSINOPHILS 0.0 0.0 - 0.4 K/UL    ABS. BASOPHILS 0.0 0.0 - 0.1 K/UL    ABS. IMM.  GRANS. 0.0 K/UL    DF MANUAL      PLATELET COMMENTS ADEQUATE PLATELETS      RBC COMMENTS MICROCYTOSIS  1+        RBC COMMENTS OVALOCYTES  1+        RBC COMMENTS SCHISTOCYTES  1+       LIPASE    Collection Time: 08/25/22 12:10 PM   Result Value Ref Range    Lipase 339 73 - 929 U/L   METABOLIC PANEL, COMPREHENSIVE    Collection Time: 08/25/22 12:10 PM   Result Value Ref Range    Sodium 136 136 - 145 mmol/L    Potassium 4.8 3.5 - 5.5 mmol/L    Chloride 101 100 - 111 mmol/L    CO2 22 21 - 32 mmol/L    Anion gap 13 3.0 - 18 mmol/L    Glucose 421 (HH) 74 - 99 mg/dL     (H) 7.0 - 18 MG/DL    Creatinine 4.61 (H) 0.6 - 1.3 MG/DL    BUN/Creatinine ratio 41 (H) 12 - 20      GFR est AA 15 (L) >60 ml/min/1.73m2    GFR est non-AA 13 (L) >60 ml/min/1.73m2    Calcium 8.7 8.5 - 10.1 MG/DL    Bilirubin, total 0.3 0.2 - 1.0 MG/DL    ALT (SGPT) 118 (H) 16 - 61 U/L    AST (SGOT) 49 (H) 10 - 38 U/L    Alk.  phosphatase 182 (H) 45 - 117 U/L    Protein, total 5.8 (L) 6.4 - 8.2 g/dL    Albumin 1.2 (L) 3.4 - 5.0 g/dL    Globulin 4.6 (H) 2.0 - 4.0 g/dL    A-G Ratio 0.3 (L) 0.8 - 1.7     NT-PRO BNP    Collection Time: 08/25/22 12:10 PM   Result Value Ref Range    NT pro-BNP 2,919 (H) 0 - 900 PG/ML   TROPONIN-HIGH SENSITIVITY    Collection Time: 08/25/22 12:10 PM   Result Value Ref Range    Troponin-High Sensitivity 86 (H) 0 - 78 ng/L   PTT    Collection Time: 08/25/22 12:10 PM   Result Value Ref Range    aPTT 33.0 23.0 - 36.4 SEC   PROCALCITONIN    Collection Time: 08/25/22 12:10 PM   Result Value Ref Range    Procalcitonin 4.94 ng/mL   TSH 3RD GENERATION    Collection Time: 08/25/22 12:10 PM   Result Value Ref Range    TSH 1.86 0.36 - 3.74 uIU/mL   HEMOGLOBIN A1C WITH EAG    Collection Time: 08/25/22 12:10 PM   Result Value Ref Range    Hemoglobin A1c 10.3 (H) 4.2 - 5.6 %    Est. average glucose 249 mg/dL   LACTIC ACID    Collection Time: 08/25/22  1:00 PM   Result Value Ref Range    Lactic acid 3.2 (HH) 0.4 - 2.0 MMOL/L   AMMONIA    Collection Time: 08/25/22  1:00 PM   Result Value Ref Range    Ammonia, plasma 31 11 - 32 UMOL/L   RESPIRATORY VIRUS PANEL W/COVID-19, PCR    Collection Time: 08/25/22  1:15 PM    Specimen: Nasopharyngeal   Result Value Ref Range    Adenovirus Not detected NOTD      Coronavirus 229E Not detected NOTD      Coronavirus HKU1 Not detected NOTD      Coronavirus CVNL63 Not detected NOTD      Coronavirus OC43 Not detected NOTD      SARS-CoV-2, PCR Not detected NOTD      Metapneumovirus Not detected NOTD      Rhinovirus and Enterovirus Not detected NOTD      Influenza A Not detected NOTD      Influenza A, subtype H1 Not detected NOTD      Influenza A, subtype H3 Not detected NOTD      INFLUENZA A H1N1 PCR Not detected NOTD      Influenza B Not detected NOTD      Parainfluenza 1 Not detected NOTD      Parainfluenza 2 Not detected NOTD      Parainfluenza 3 Not detected NOTD      Parainfluenza virus 4 Not detected NOTD      RSV by PCR Not detected NOTD      B. parapertussis, PCR Not detected NOTD      Bordetella pertussis - PCR Not detected NOTD      Chlamydophila pneumoniae DNA, QL, PCR Not detected NOTD      Mycoplasma pneumoniae DNA, QL, PCR Not detected NOTD     EKG, 12 LEAD, SUBSEQUENT    Collection Time: 08/25/22  2:43 PM   Result Value Ref Range    Ventricular Rate 82 BPM    Atrial Rate 82 BPM    P-R Interval 160 ms    QRS Duration 152 ms    Q-T Interval 438 ms    QTC Calculation (Bezet) 511 ms    Calculated P Axis 83 degrees    Calculated R Axis -31 degrees    Calculated T Axis 127 degrees    Diagnosis       Normal sinus rhythm  Possible Left atrial enlargement  Left axis deviation  Left bundle branch block  Abnormal ECG  When compared with ECG of 25-AUG-2022 09:46,  QRS axis shifted left     URINALYSIS W/ RFLX MICROSCOPIC    Collection Time: 08/25/22  3:14 PM   Result Value Ref Range    Color TRACY      Appearance CLOUDY      Specific gravity 1.025 1.003 - 1.030      pH (UA) 5.5 5.0 - 8.0      Protein >300 (A) NEG mg/dL    Glucose 100 (A) NEG mg/dL    Ketone 15 (A) NEG mg/dL    Bilirubin Negative NEG      Blood Negative NEG      Urobilinogen 0.2 0.2 - 1.0 EU/dL    Nitrites Positive (A) NEG      Leukocyte Esterase (A) NEG       LARGE  Macroscopic performed on spun urine due to gross blood  or mucus     URINE MICROSCOPIC ONLY    Collection Time: 08/25/22  3:14 PM   Result Value Ref Range    WBC TOO NUMEROUS TO COUNT 0 - 5 /hpf    RBC 36 to 50 0 - 5 /hpf    Epithelial cells FEW 0 - 5 /lpf    Bacteria 3+ (A) NEG /hpf   ECHO ADULT COMPLETE    Collection Time: 08/25/22  3:49 PM   Result Value Ref Range    IVSd 0.9 0.6 - 1.0 cm    LVIDd 4.3 4.2 - 5.9 cm    LVIDs 2.7 cm    LVOT Diameter 2.0 cm    LVPWd 1.0 0.6 - 1.0 cm    EF BP 53 (A) 55 - 100 %    LV Ejection Fraction A2C 40 %    LV Ejection Fraction A4C 60 %    LV EDV A2C 57 mL LV EDV A4C 61 mL    LV EDV BP 61 (A) 67 - 155 mL    LV ESV A2C 34 mL    LV ESV A4C 24 mL    LV ESV BP 29 22 - 58 mL    LVOT Peak Gradient 3 mmHg    LVOT Mean Gradient 1 mmHg    LVOT SV 33.9 ml    LVOT Peak Velocity 0.8 m/s    LVOT VTI 10.8 cm    RVIDd 3.0 cm    RV Free Wall Peak S' 12 cm/s    RVOT Peak Gradient 2 mmHg    RVOT Mean Gradient 1 mmHg    RVOT Peak Velocity 0.8 m/s    RVOT VTI 14.0 cm    LA Diameter 2.6 cm    LA Volume A/L 58 mL    LA Volume 2C 68 (A) 18 - 58 mL    LA Volume 4C 30 18 - 58 mL    LA Volume BP 51 18 - 58 mL    AV Area by Peak Velocity 1.9 cm2    AV Area by VTI 1.7 cm2    AV Peak Gradient 7 mmHg    AV Mean Gradient 4 mmHg    AV Peak Velocity 1.3 m/s    AV Mean Velocity 0.9 m/s    AV VTI 19.0 cm    MV A Velocity 1.38 m/s    MV E Wave Deceleration Time 133.0 ms    MV E Velocity 0.87 m/s    LV E' Lateral Velocity 4 cm/s    TAPSE 1.8 1.7 cm    TR Peak Gradient 28 mmHg    TR Max Velocity 2.66 m/s    Aortic Root 3.4 cm    Fractional Shortening 2D 37 28 - 44 %    LV ESV Index BP 19 mL/m2    LV EDV Index BP 40 mL/m2    LV ESV Index A4C 16 mL/m2    LV EDV Index A4C 40 mL/m2    LV ESV Index A2C 23 mL/m2    LV EDV Index A2C 38 mL/m2    LVIDd Index 2.85 cm/m2    LVIDs Index 1.79 cm/m2    LV RWT Ratio 0.47     LV Mass 2D 132.7 88 - 224 g    LV Mass 2D Index 87.9 49 - 115 g/m2    MV E/A 0.63     E/E' Lateral 21.75     LA Volume Index BP 34 16 - 34 ml/m2    LA Volume Index A/L 38 16 - 34 mL/m2    LVOT Stroke Volume Index 22.5 mL/m2    LVOT Area 3.1 cm2    LA Volume Index 2C 45 (A) 16 - 34 mL/m2    LA Volume Index 4C 20 16 - 34 mL/m2    LA Size Index 1.72 cm/m2    LA/AO Root Ratio 0.76     Ao Root Index 2.25 cm/m2    AV Velocity Ratio 0.62     LVOT:AV VTI Index 0.57     MISSY/BSA VTI 1.1 cm2/m2    MISSY/BSA Peak Velocity 1.3 cm2/m2    Est. RA Pressure 8 mmHg    RVSP 36 mmHg   GLUCOSE, POC    Collection Time: 08/25/22  4:55 PM   Result Value Ref Range    Glucose (POC) 372 (H) 70 - 110 mg/dL   LACTIC ACID Collection Time: 08/25/22  4:57 PM   Result Value Ref Range    Lactic acid 2.3 (HH) 0.4 - 2.0 MMOL/L   GLUCOSE, POC    Collection Time: 08/25/22  4:58 PM   Result Value Ref Range    Glucose (POC) 386 (H) 70 - 110 mg/dL   CULTURE, BLOOD    Collection Time: 08/25/22 10:11 PM    Specimen: Blood   Result Value Ref Range    Special Requests: NO SPECIAL REQUESTS      Culture result: NO GROWTH AFTER 8 HOURS     CULTURE, BLOOD    Collection Time: 08/25/22 10:21 PM    Specimen: Blood   Result Value Ref Range    Special Requests: NO SPECIAL REQUESTS      Culture result: NO GROWTH AFTER 8 HOURS     GLUCOSE, POC    Collection Time: 08/25/22 11:30 PM   Result Value Ref Range    Glucose (POC) 287 (H) 70 - 756 mg/dL   METABOLIC PANEL, COMPREHENSIVE    Collection Time: 08/26/22  5:00 AM   Result Value Ref Range    Sodium 138 136 - 145 mmol/L    Potassium 5.2 3.5 - 5.5 mmol/L    Chloride 107 100 - 111 mmol/L    CO2 18 (L) 21 - 32 mmol/L    Anion gap 13 3.0 - 18 mmol/L    Glucose 190 (H) 74 - 99 mg/dL     (H) 7.0 - 18 MG/DL    Creatinine 4.50 (H) 0.6 - 1.3 MG/DL    BUN/Creatinine ratio 42 (H) 12 - 20      GFR est AA 16 (L) >60 ml/min/1.73m2    GFR est non-AA 13 (L) >60 ml/min/1.73m2    Calcium 8.1 (L) 8.5 - 10.1 MG/DL    Bilirubin, total 0.4 0.2 - 1.0 MG/DL    ALT (SGPT) 107 (H) 16 - 61 U/L    AST (SGOT) 134 (H) 10 - 38 U/L    Alk.  phosphatase 122 (H) 45 - 117 U/L    Protein, total 6.0 (L) 6.4 - 8.2 g/dL    Albumin 1.8 (L) 3.4 - 5.0 g/dL    Globulin 4.2 (H) 2.0 - 4.0 g/dL    A-G Ratio 0.4 (L) 0.8 - 1.7     MAGNESIUM    Collection Time: 08/26/22  5:00 AM   Result Value Ref Range    Magnesium 2.2 1.6 - 2.6 mg/dL   PHOSPHORUS    Collection Time: 08/26/22  5:00 AM   Result Value Ref Range    Phosphorus 6.7 (H) 2.5 - 4.9 MG/DL   CBC WITH AUTOMATED DIFF    Collection Time: 08/26/22  5:00 AM   Result Value Ref Range    WBC 12.1 4.6 - 13.2 K/uL    RBC 3.30 (L) 4.35 - 5.65 M/uL    HGB 8.5 (L) 13.0 - 16.0 g/dL    HCT 25.8 (L) 36.0 - 48.0 %    MCV 78.2 78.0 - 100.0 FL    MCH 25.8 24.0 - 34.0 PG    MCHC 32.9 31.0 - 37.0 g/dL    RDW 14.3 11.6 - 14.5 %    PLATELET 198 248 - 118 K/uL    MPV 10.9 9.2 - 11.8 FL    NRBC 0.0 0  WBC    ABSOLUTE NRBC 0.00 0.00 - 0.01 K/uL    NEUTROPHILS 60 40 - 73 %    BAND NEUTROPHILS 31 (H) 0 - 5 %    LYMPHOCYTES 3 (L) 21 - 52 %    MONOCYTES 1 (L) 3 - 10 %    EOSINOPHILS 0 0 - 5 %    BASOPHILS 0 0 - 2 %    METAMYELOCYTES 5 (H) 0 %    IMMATURE GRANULOCYTES 0 %    ABS. NEUTROPHILS 11.0 (H) 1.8 - 8.0 K/UL    ABS. LYMPHOCYTES 0.4 (L) 0.9 - 3.6 K/UL    ABS. MONOCYTES 0.1 0.05 - 1.2 K/UL    ABS. EOSINOPHILS 0.0 0.0 - 0.4 K/UL    ABS. BASOPHILS 0.0 0.0 - 0.1 K/UL    ABS. IMM.  GRANS. 0.0 K/UL    DF MANUAL      PLATELET COMMENTS ADEQUATE PLATELETS      RBC COMMENTS OVALOCYTES  1+        RBC COMMENTS SCHISTOCYTES  FEW       CALCIUM, IONIZED    Collection Time: 08/26/22  5:00 AM   Result Value Ref Range    Ionized Calcium 1.12 1.12 - 1.32 MMOL/L   LACTIC ACID    Collection Time: 08/26/22  5:00 AM   Result Value Ref Range    Lactic acid 1.9 0.4 - 2.0 MMOL/L   GLUCOSE, POC    Collection Time: 08/26/22  5:16 AM   Result Value Ref Range    Glucose (POC) 204 (H) 70 - 110 mg/dL         Chemistry Recent Labs     08/26/22  0500 08/25/22  1210   * 421*    136   K 5.2 4.8    101   CO2 18* 22   * 189*   CREA 4.50* 4.61*   CA 8.1* 8.7   MG 2.2  --    PHOS 6.7*  --    AGAP 13 13   BUCR 42* 41*   * 182*   TP 6.0* 5.8*   ALB 1.8* 1.2*   GLOB 4.2* 4.6*   AGRAT 0.4* 0.3*        Lactic Acid Lactic acid   Date Value Ref Range Status   08/26/2022 1.9 0.4 - 2.0 MMOL/L Final     Recent Labs     08/26/22  0500 08/25/22  1657 08/25/22  1300   LAC 1.9 2.3* 3.2*        Liver Enzymes Protein, total   Date Value Ref Range Status   08/26/2022 6.0 (L) 6.4 - 8.2 g/dL Final     Albumin   Date Value Ref Range Status   08/26/2022 1.8 (L) 3.4 - 5.0 g/dL Final     Globulin   Date Value Ref Range Status   08/26/2022 4.2 (H) 2.0 - 4.0 g/dL Final     A-G Ratio   Date Value Ref Range Status   08/26/2022 0.4 (L) 0.8 - 1.7   Final     Alk. phosphatase   Date Value Ref Range Status   08/26/2022 122 (H) 45 - 117 U/L Final     Recent Labs     08/26/22  0500 08/25/22  1210   TP 6.0* 5.8*   ALB 1.8* 1.2*   GLOB 4.2* 4.6*   AGRAT 0.4* 0.3*   * 182*        CBC w/Diff Recent Labs     08/26/22  0500 08/25/22  1210   WBC 12.1 9.6   RBC 3.30* 3.80*   HGB 8.5* 9.9*   HCT 25.8* 29.3*    318   GRANS 60 62   LYMPH 3* 4*   EOS 0 0          Cardiac Enzymes No results found for: CPK, CK, CKMMB, CKMB, RCK3, CKMBT, CKNDX, CKND1, CAROLYN, TROPT, TROIQ, RONALD, TROPT, TNIPOC, BNP, BNPP     BNP No results found for: BNP, BNPP, XBNPT     Coagulation Recent Labs     08/25/22  1210   PTP 16.5*   INR 1.3*   APTT 33.0           Thyroid  Lab Results   Component Value Date/Time    TSH 1.86 08/25/2022 12:10 PM       No results found for: T4     Urinalysis Lab Results   Component Value Date/Time    Color TRACY 08/25/2022 03:14 PM    Appearance CLOUDY 08/25/2022 03:14 PM    Specific gravity 1.025 08/25/2022 03:14 PM    pH (UA) 5.5 08/25/2022 03:14 PM    Protein >300 (A) 08/25/2022 03:14 PM    Glucose 100 (A) 08/25/2022 03:14 PM    Ketone 15 (A) 08/25/2022 03:14 PM    Bilirubin Negative 08/25/2022 03:14 PM    Urobilinogen 0.2 08/25/2022 03:14 PM    Nitrites Positive (A) 08/25/2022 03:14 PM    Leukocyte Esterase (A) 08/25/2022 03:14 PM     LARGE  Macroscopic performed on spun urine due to gross blood  or mucus      Epithelial cells FEW 08/25/2022 03:14 PM    Bacteria 3+ (A) 08/25/2022 03:14 PM    WBC TOO NUMEROUS TO COUNT 08/25/2022 03:14 PM    RBC 36 to 50 08/25/2022 03:14 PM        Micro  Recent Labs     08/25/22 2221 08/25/22  2211 08/25/22  0945   CULT NO GROWTH AFTER 8 HOURS NO GROWTH AFTER 8 HOURS Sent to [facility] for ID/Susceptibility if indicated. CULTURE IN PROGRESS,FURTHER UPDATES TO FOLLOW  Sent to [facility] for ID/Susceptibility if indicated.   CULTURE IN PROGRESS,FURTHER UPDATES TO FOLLOW     Recent Labs     08/25/22 2221 08/25/22 2211 08/25/22 0945   CULT NO GROWTH AFTER 8 HOURS NO GROWTH AFTER 8 HOURS Sent to [facility] for ID/Susceptibility if indicated. CULTURE IN PROGRESS,FURTHER UPDATES TO FOLLOW  Sent to [facility] for ID/Susceptibility if indicated. CULTURE IN PROGRESS,FURTHER UPDATES TO FOLLOW          Culture data during this hospitalization. All Micro Results       Procedure Component Value Units Date/Time    CULTURE, BLOOD [[id number]] Collected: 08/25/22 2211    Order Status: Completed Specimen: Blood Updated: 08/26/22 0728     Special Requests: NO SPECIAL REQUESTS        Culture result: NO GROWTH AFTER 8 HOURS       CULTURE, BLOOD [[id number]] Collected: 08/25/22 2221    Order Status: Completed Specimen: Blood Updated: 08/26/22 0728     Special Requests: NO SPECIAL REQUESTS        Culture result: NO GROWTH AFTER 8 HOURS       CULTURE, BLOOD [[id number]] Collected: 08/25/22 0945    Order Status: Completed Specimen: Blood Updated: 08/26/22 0115     Special Requests: NO SPECIAL REQUESTS        GRAM STAIN       GRAM NEGATIVE RODS ANAEROBIC BOTTLE                  SMEAR CALLED TO AND CORRECTLY REPEATED BY: Anika Jones RN ICU TO [facility] AT 2150 [id number]                  AEROBIC BOTTLE GRAM NEGATIVE RODS                  SMEAR CALLED TO AND CORRECTLY REPEATED BY: Juma Rodriguez RN ICU AT 0110 ON 8/26/22 TO TSH. Culture result:       Sent to [facility] for ID/Susceptibility if indicated.                   CULTURE IN PROGRESS,FURTHER UPDATES TO FOLLOW          CULTURE, BLOOD [[id number]] Collected: 08/25/22 0945    Order Status: Completed Specimen: Blood Updated: 08/26/22 0115     Special Requests: NO SPECIAL REQUESTS        GRAM STAIN       GRAM NEGATIVE RODS ANAEROBIC BOTTLE                  SMEAR CALLED TO AND CORRECTLY REPEATED BY: Anika Jones RN ICU TO [facility] AT 2150 ON [id number]                  AEROBIC BOTTLE GRAM NEGATIVE RODS                  SMEAR CALLED TO AND CORRECTLY REPEATED BY: Percy Leon RN ICU AT 0110 ON 8/26/22 TO TSH. Culture result:       Sent to [facility] for ID/Susceptibility if indicated. CULTURE IN PROGRESS,FURTHER UPDATES TO FOLLOW          CULTURE, URINE [[id number]] Collected: 08/25/22 1514    Order Status: Sent Specimen: Cath Urine Updated: 08/25/22 2340    BLOOD CULTURE ID PANEL [[id number]] Collected: 08/25/22 0945    Order Status: No result Updated: 08/25/22 2146    RESPIRATORY VIRUS PANEL W/COVID-19, PCR [[id number]] Collected: 08/25/22 1315    Order Status: Completed Specimen: Nasopharyngeal Updated: 08/25/22 1930     Adenovirus Not detected        Coronavirus 229E Not detected        Coronavirus HKU1 Not detected        Coronavirus CVNL63 Not detected        Coronavirus OC43 Not detected        SARS-CoV-2, PCR Not detected        Metapneumovirus Not detected        Rhinovirus and Enterovirus Not detected        Influenza A Not detected        Influenza A, subtype H1 Not detected        Influenza A, subtype H3 Not detected        INFLUENZA A H1N1 PCR Not detected        Influenza B Not detected        Parainfluenza 1 Not detected        Parainfluenza 2 Not detected        Parainfluenza 3 Not detected        Parainfluenza virus 4 Not detected        RSV by PCR Not detected        B. parapertussis, PCR Not detected        Bordetella pertussis - PCR Not detected        Chlamydophila pneumoniae DNA, QL, PCR Not detected        Mycoplasma pneumoniae DNA, QL, PCR Not detected       CULTURE, RESPIRATORY/SPUTUM/BRONCH [address] [[id number]]     Order Status: Sent Specimen: Sputum from Tracheal Aspirate     INFLUENZA A & B AG (RAPID TEST) [[id number]] Collected: 08/25/22 1024    Order Status: Completed Specimen: Nasopharyngeal from Nasal washing Updated: 08/25/22 1104     Influenza A Antigen Negative        Comment: A negative result does not exclude influenza virus infection, seasonal or H1N1 due to suboptimal sensitivity.  If influenza is circulating in your community, a diagnosis of influenza should be considered based on a patients clinical presentation and empiric antiviral treatment should be considered, if indicated. Influenza B Antigen Negative       COVID-19 RAPID TEST [[id number]] Collected: 08/25/22 1024    Order Status: Completed Specimen: Nasopharyngeal Updated: 08/25/22 1103     Specimen source Nasopharyngeal        COVID-19 rapid test Not detected        Comment: Rapid Abbott ID Now       Rapid NAAT:  The specimen is NEGATIVE for SARS-CoV-2, the novel coronavirus associated with COVID-19. Negative results should be treated as presumptive and, if inconsistent with clinical signs and symptoms or necessary for patient management, should be tested with an alternative molecular assay. Negative results do not preclude SARS-CoV-2 infection and should not be used as the sole basis for patient management decisions. This test has been authorized by the FDA under an Emergency Use Authorization (EUA) for use by authorized laboratories. Fact sheet for Healthcare Providers: [employer]  Fact sheet for Patients: [employer]       Methodology: Isothermal Nucleic Acid Amplification                  ECHO 6/29/2022   Anya CONCLUSIONS     * Left ventricular systolic function is moderately reduced with an ejection   fraction of 30 % by visual estimation. * Moderate global hypokinesis of the left ventricle. * Left ventricular diastolic function: Grade II diastolic dysfunction. * There is moderately increased left ventricular wall thickness. * Right ventricular fractional area change is normal, 39 %. * Normal RV size and systolic function. * Dilated LA. * Mitral valve has heavy annular and moderate leaflet calcification with   limited motion of the posterior leaflet. * The mitral valve mean gradient is 4 mmHg.      * There is mild mitral valve regurgitation. * No pulmonary hypertension, estimated pulmonary arterial systolic pressure   is 16 mmHg. Comparison     * Compared to prior study from 6/23/20: EF was 25% and MS is new. Echo 8/25/2022  Interpretation Summary  Result status: Final result     Technical qualifiers: Echo study was technically difficult due to patient's body habitus. Left Ventricle: Normal left ventricular systolic function with a visually estimated EF of 50 - 55%. Left ventricle size is normal. Increased wall thickness. See diagram for wall motion findings. Grade I diastolic dysfunction present with normal LV EF. Mitral Valve: Valve structure is normal. Moderate annular calcification at the posterior leaflet of the mitral valve. Trace regurgitation. No stenosis noted. Tricuspid Valve: The estimated PASP is 36 mmHg. Images report reviewed by me:  CT 8/25 (Most Recent)  Results from Hospital Encounter encounter on 08/25/22    CT CHEST ABD PELV WO CONT    Narrative  EXAM: CT chest, abdomen, and pelvis    INDICATION: Altered mental status, sepsis, multifocal pneumonia. COMPARISON: Several prior radiographs obtained previously the same day. No prior  CT imaging available for review. TECHNIQUE: Axial CT imaging of the chest, abdomen, and pelvis was performed  without contrast administration. Multiplanar reformats were generated. One or more dose reduction techniques were used on this CT: automated exposure  control, adjustment of the mAs and/or kVp according to patient size, and  iterative reconstruction techniques. The specific techniques used on this CT  exam have been documented in the patient's electronic medical record.   Digital  Imaging and Communications in Medicine (DICOM) format image data are available  to nonaffiliated external healthcare facilities or entities on a secure, media  free, reciprocally searchable basis with patient authorization for at least a  12-month period after this study. _______________    FINDINGS:    CHEST:    MEDIASTINUM: Right IJ central venous catheter tip present in the distal IVC. Normal cardiac size. No pericardial effusion. Thoracic aorta is diffusely  atherosclerotic but of normal course/caliber. LYMPH NODES: No pathologically enlarged mediastinal or hilar lymph nodes. PLEURA: Trace left effusion. LUNGS/AIRWAY: Endotracheal tube appropriately positioned within the  intrathoracic trachea. Multifocal areas of groundglass opacity are noted,  greatest in conspicuity across the left upper and right upper lobes with  additional peribronchial consolidation. Dense consolidation throughout the  lingula and left lower lobe. OTHER: None. ABDOMEN/PELVIS:    LIVER, BILIARY: Scattered granulomata, otherwise unremarkable. No abnormal  biliary dilation. Cholecystectomy. PANCREAS: Unremarkable. SPLEEN: Unremarkable. ADRENALS: Unremarkable. KIDNEYS: No hydronephrosis. No nephrolithiasis. LYMPH NODES: No pathologically enlarged lymph nodes. GASTROINTESTINAL TRACT: No abnormal bowel dilation or wall thickening. No bowel  obstruction. No free intraperitoneal gas. Normal appendix. Gastric tube  appropriately positioned within the stomach. PELVIC ORGANS: Urinary bladder is thick-walled, contains small foci of  intravesicular gas, and is decompressed with a Morrison catheter in situ. VASCULATURE: Diffuse atherosclerosis without aneurysm    OSSEOUS: Grade 1 anterolisthesis L4 on L5 with advanced same level spondylosis  and facet arthropathy. OTHER: None.    _______________    Impression  1. Within the chest:  > Endotracheal tube and right IJV central venous catheter both appropriately  positioned.  > Multifocal pneumonia with dense consolidation throughout the left lower  lobe. Trace left effusion. 2. Within the abdomen/pelvis:  > No abnormal bowel wall thickening or inflammatory change.  > No hydronephrosis.  Urinary bladder decompressed with Morrison catheter in situ. Small foci of nondependent gas within the bladder lumen likely secondary to  indwelling catheter. CT head 8/25  IMPRESSION   1. No acute intracranial abnormality. 2. Subcortical and periventricular white matter low-attenuation compatible with  sequela of chronic ischemic microvascular change. CXR reviewed by me:  XR 8/26 (Most Recent). Results from Hospital Encounter encounter on 08/25/22    XR CHEST PORT    Narrative  EXAM: XR CHEST PORT    CLINICAL INDICATION/HISTORY: intubated  -Additional: None    COMPARISON: One day prior    TECHNIQUE: Portable frontal view of the chest    _______________    FINDINGS:    SUPPORT DEVICES: Right IJV central venous catheter tip at the cavoatrial  junction. Endotracheal tube in the midthoracic trachea. Enteric tube tip within  the stomach. HEART AND MEDIASTINUM: Cardiomediastinal silhouette within normal limits. LUNGS AND PLEURAL SPACES: Bilateral patchy parenchymal opacities, left greater  right with left pleural effusion. No pneumothorax.    _______________    Impression  Bilateral patchy parenchymal opacities, left greater right, with left pleural  effusion. Please note: Voice-recognition software may have been used to generate this report, which may have resulted in some phonetic-based errors in grammar and contents. Even though attempts were made to correct all the mistakes, some may have been missed, and remained in the body of the document.       Gini Elizondo MD  8/26/2022

## 2022-08-26 NOTE — PROGRESS NOTES
Comprehensive Nutrition Assessment    Type and Reason for Visit: Initial, Positive nutrition screen, Consult    Nutrition Recommendations/Plan:   Will place tube feeding order for Nepro @ 10ml/hr trickle feeding. Monitor for tolerance. 08/26/22 8100   Enteral Nutrition   Feeding Route Orogastric   EN Formula Renal   Schedule Continuous   Feeding Regimen Goal: nepro @ 30ml/hr. Initiate trickles @ 10ml/hr. Additives/Modulars None   Water Flushes 150ml q6   Goal EN & Flush Order Provides Nepro @ 30ml/hr:   1188kcals, 53g PRO, 110g CHO, 479ml free water + 600ml free water (1079ml)        Malnutrition Assessment:  Malnutrition Status:  Severe malnutrition (08/26/22 0827)    Context:  Chronic illness     Findings of the 6 clinical characteristics of malnutrition:   Energy Intake:  Unable to assess  Weight Loss:  Greater than 7.5% over 3 months     Body Fat Loss:  Severe body fat loss, Orbital, Buccal region   Muscle Mass Loss:  Severe muscle mass loss, Temples (temporalis)  Fluid Accumulation:  Unable to assess,     Strength:  Not performed     Nutrition Assessment:    Pt with cirrhosis, CKD, HTN, cardiomyopathy, mitral stenosis. Admitted for Acute respiratory failure with hypoxia - intubated and on vent. Pt with multiple pressure ulcers- per wound care 27 pressure injuries with severity from stage 2 to unstageable and deep tissue injuries. Pt with OG tube - clamped. Pt lost significant amount of weight per chart review: 134lb (6/27/22), 167lb (9/28/2020). Per RN- plans to start trickle feeding. Received consult for TF. Nutrition Related Findings:    GFR est AA 16, , creatinine 4.50, phos 6. 7. humalog. No BM at this time.  Wound Type: Multiple, Pressure injury (per wound care note 8/25)    Current Nutrition Intake & Therapies:  Average Meal Intake: NPO     DIET NPO    Anthropometric Measures:  Height: 5' 9\" (175.3 cm)  Ideal Body Weight (IBW): 160 lbs (73 kg)  Admission Body Weight: 96 lb (per H&P)  Current Body Wt:  43.5 kg (96 lb), 60 % IBW. Bed scale  Current BMI (kg/m2): 14.2  Usual Body Weight: 60.8 kg (134 lb) (6/27/22)  % Weight Change (Calculated): -28.4  Weight Adjustment: No adjustment                 BMI Category: Underweight (BMI less than 22) age over 72    Estimated Daily Nutrient Needs:  Energy Requirements Based On: Kcal/kg  Weight Used for Energy Requirements: Current (25-30kcal)  Energy (kcal/day): 7487-9341  Weight Used for Protein Requirements: Current (1.2-1.5g)  Protein (g/day): 52-65  Method Used for Fluid Requirements: 1 ml/kcal  Fluid (ml/day): 4139-2485    Nutrition Diagnosis:   Inadequate oral intake related to impaired respiratory function as evidenced by NPO or clear liquid status due to medical condition, intubation  Severe malnutrition related to inadequate protein-energy intake, catabolic illness as evidenced by Criteria as identified in malnutrition assessment    Nutrition Interventions:   Food and/or Nutrient Delivery: Continue NPO, Start tube feeding  Nutrition Education/Counseling: No recommendations at this time  Coordination of Nutrition Care: Continue to monitor while inpatient       Goals:     Goals: Initiate nutrition support, by next RD assessment       Nutrition Monitoring and Evaluation:   Behavioral-Environmental Outcomes: None identified  Food/Nutrient Intake Outcomes: Diet advancement/tolerance  Physical Signs/Symptoms Outcomes: Biochemical data, GI status, Hemodynamic status, Nutrition focused physical findings, Skin, Weight    Discharge Planning:     Too soon to determine    Lowell Early RD

## 2022-08-26 NOTE — WOUND CARE
IP WOUND CONSULT    [id number] [facility]ly [facility], [facility]1983 RECORD NUMBER:  [id number]  AGE: 71 y.o. GENDER: male  : 1950  TODAY'S DATE:  2022    GENERAL     [] Follow-up   [x] New Consult    Tami Rowe is a 71 y.o. male referred by:   [] Physician  [x] Nursing  [] Other:         PAST MEDICAL HISTORY    Past Medical History:   Diagnosis Date    Chronic kidney disease     Cirrhosis of liver ([facility] [address]     Decubitus skin ulcer     Diabetes ([facility] [address]     HTN (hypertension)     Muscle weakness     Polyneuropathy         PAST SURGICAL HISTORY    History reviewed. No pertinent surgical history. FAMILY HISTORY    History reviewed. No pertinent family history. SOCIAL HISTORY         ALLERGIES    No Known Allergies    MEDICATIONS    No current facility-administered medications on file prior to encounter. No current outpatient medications on file prior to encounter. Wt Readings from Last 3 Encounters:   22 43.5 kg (96 lb)       [email or web address] Vitals  BP (!) 144/56   Pulse 87   Temp 99.5 °F (37.5 °C)   Resp 24   Ht 5' 9\" (1.753 m)   Wt 43.5 kg (96 lb)   SpO2 96%   BMI 14.18 kg/m²       ASSESSMENT     Skin impairment Identification:  Type: pressure    Contributing Factors: chronic pressure, decreased mobility, shear force, incontinence of stool, incontinence of urine, malnutrition, and poor hygiene    Wound Knee Anterior;Right multiple pressure injuries 22 (Active)   Wound Image   22 1600   Wound Etiology Deep Tissue/Injury 22 1600   Dressing Status Clean;Dry; Intact 22 0600   Dressing/Treatment Foam 22 0600   Wound Assessment Purple/maroon 22 1600   Drainage Amount None 22 1600   Wound Odor None 22 1600   Lolita-Wound/Incision Assessment Intact 22 1600   Number of days: 1       Wound Knee Anterior; Left multiple pressure injuries (Active)   Wound Image   22 1600   Wound Etiology Deep Tissue/Injury 22 1600   Dressing Status Clean;Dry; Intact 08/26/22 0600   Dressing/Treatment Foam 08/26/22 0600   Wound Assessment Purple/maroon 08/25/22 1600   Drainage Amount None 08/25/22 1600   Wound Odor None 08/25/22 1600   Lolita-Wound/Incision Assessment Intact 08/25/22 1600   Number of days: 1       Wound Foot Right;Lateral 08/25/22 (Active)   Wound Image   08/25/22 1600   Wound Etiology Deep Tissue/Injury 08/25/22 1600   Dressing Status Clean;Dry; Intact 08/26/22 0600   Dressing/Treatment Foam 08/26/22 0600   Wound Length (cm) 4.5 cm 08/25/22 1600   Wound Width (cm) 4 cm 08/25/22 1600   Wound Surface Area (cm^2) 18 cm^2 08/25/22 1600   Wound Assessment Purple/maroon 08/25/22 1600   Drainage Amount None 08/25/22 1600   Wound Odor None 08/25/22 1600   Lolita-Wound/Incision Assessment Fragile 08/25/22 1600   Number of days: 1       Wound Toe (Comment  which one) Right;Lateral 5th toe (Active)   Wound Image   08/25/22 1600   Wound Etiology Pressure Stage 2 08/25/22 1600   Dressing Status Clean;Dry; Intact 08/26/22 0600   Dressing/Treatment Foam 08/26/22 0600   Wound Length (cm) 6 cm 08/25/22 1600   Wound Width (cm) 6 cm 08/25/22 1600   Wound Surface Area (cm^2) 36 cm^2 08/25/22 1600   Wound Assessment Fluid filled blister 08/25/22 1600   Drainage Amount Small 08/25/22 1600   Drainage Description Serosanguinous 08/25/22 1600   Wound Odor None 08/25/22 1600   Lolita-Wound/Incision Assessment Fragile 08/25/22 1600   Number of days: 1       Wound Foot Right;Medial 1st MPJ 08/25/22 (Active)   Wound Image   08/25/22 1600   Wound Etiology Deep Tissue/Injury 08/25/22 1600   Dressing Status Clean;Dry; Intact 08/26/22 0600   Dressing/Treatment Foam 08/26/22 0600   Wound Length (cm) 3 cm 08/25/22 1600   Wound Width (cm) 3 cm 08/25/22 1600   Wound Surface Area (cm^2) 9 cm^2 08/25/22 1600   Wound Assessment Purple/maroon 08/25/22 1600   Drainage Amount None 08/25/22 1600   Wound Odor None 08/25/22 1600   Lolita-Wound/Incision Assessment Fragile 08/25/22 1600   Number of days: 1       Wound Toe (Comment  which one) Right;Medial 1st toe 08/25/22 (Active)   Wound Image   08/25/22 1600   Wound Etiology Pressure Stage 2 08/25/22 1600   Dressing Status Clean;Dry; Intact 08/26/22 0600   Dressing/Treatment Foam 08/26/22 0600   Wound Length (cm) 4 cm 08/25/22 1600   Wound Width (cm) 3.5 cm 08/25/22 1600   Wound Surface Area (cm^2) 14 cm^2 08/25/22 1600   Wound Assessment Fluid filled blister 08/25/22 1600   Drainage Amount Scant 08/25/22 1600   Drainage Description Serous 08/25/22 1600   Wound Odor None 08/25/22 1600   Lolita-Wound/Incision Assessment Fragile 08/25/22 1600   Wound Thickness Description Partial thickness 08/25/22 1600   Number of days: 1       Wound Ankle Left;Posterior 08/25/22 (Active)   Wound Image   08/25/22 1600   Dressing Status Clean;Dry; Intact 08/26/22 0600   Dressing/Treatment Foam 08/26/22 0600   Wound Length (cm) 6 cm 08/25/22 1600   Wound Width (cm) 3 cm 08/25/22 1600   Wound Surface Area (cm^2) 18 cm^2 08/25/22 1600   Wound Assessment Purple/maroon 08/25/22 1600   Drainage Amount None 08/25/22 1600   Wound Odor None 08/25/22 1600   Lolita-Wound/Incision Assessment Intact;Fragile 08/25/22 1600   Number of days: 1       Wound Toe (Comment  which one) Left;Lateral 5th toe 08/25/22 (Active)   Wound Image   08/25/22 1600   Wound Etiology Deep Tissue/Injury 08/25/22 1600   Dressing Status Clean;Dry; Intact 08/26/22 0600   Dressing/Treatment Foam 08/26/22 0600   Wound Length (cm) 1 cm 08/25/22 1600   Wound Width (cm) 1 cm 08/25/22 1600   Wound Surface Area (cm^2) 1 cm^2 08/25/22 1600   Wound Assessment Purple/maroon 08/25/22 1600   Drainage Amount None 08/25/22 1600   Wound Odor None 08/25/22 1600   Lolita-Wound/Incision Assessment Fragile 08/25/22 1600   Wound Thickness Description Full thickness 08/25/22 1600   Number of days: 1       Wound Foot Left;Lateral 5th MPJ 08/25/22 (Active)   Wound Image   08/25/22 1600   Wound Etiology Deep Tissue/Injury 08/25/22 1600   Dressing Status Clean;Dry; Intact 08/26/22 0600   Dressing/Treatment Foam 08/26/22 0600   Wound Length (cm) 1.5 cm 08/25/22 1600   Wound Width (cm) 1.5 cm 08/25/22 1600   Wound Surface Area (cm^2) 2.25 cm^2 08/25/22 1600   Wound Assessment Purple/maroon 08/25/22 1600   Drainage Amount None 08/25/22 1600   Wound Odor None 08/25/22 1600   Lolita-Wound/Incision Assessment Fragile 08/25/22 1600   Number of days: 1       Wound Ankle Left;Lateral (Active)   Wound Image   08/25/22 1600   Wound Etiology Deep Tissue/Injury 08/25/22 1600   Dressing Status Clean;Dry; Intact 08/26/22 0600   Dressing/Treatment Foam 08/26/22 0600   Wound Length (cm) 2 cm 08/25/22 1600   Wound Width (cm) 2.5 cm 08/25/22 1600   Wound Surface Area (cm^2) 5 cm^2 08/25/22 1600   Wound Assessment Purple/maroon 08/25/22 1600   Drainage Amount None 08/25/22 1600   Wound Odor None 08/25/22 1600   Lolita-Wound/Incision Assessment Intact;Fragile 08/25/22 1600   Number of days: 1       Wound Elbow Left;Posterior 08/25/22 (Active)   Wound Image   08/25/22 1600   Dressing Status Clean;Dry; Intact 08/26/22 0600   Dressing/Treatment Foam 08/26/22 0600   Wound Length (cm) 4 cm 08/25/22 1600   Wound Width (cm) 4 cm 08/25/22 1600   Wound Surface Area (cm^2) 16 cm^2 08/25/22 1600   Wound Assessment Purple/maroon 08/25/22 1600   Drainage Amount None 08/25/22 1600   Wound Odor None 08/25/22 1600   Lolita-Wound/Incision Assessment Intact;Fragile 08/25/22 1600   Number of days: 1       Wound Heel Left 08/25/22 (Active)   Wound Image   08/25/22 1600   Wound Etiology Pressure Stage 2 08/25/22 1600   Dressing Status Clean;Dry; Intact 08/26/22 0600   Dressing/Treatment Foam 08/26/22 0600   Wound Length (cm) 5.5 cm 08/25/22 1600   Wound Width (cm) 3 cm 08/25/22 1600   Wound Surface Area (cm^2) 16.5 cm^2 08/25/22 1600   Wound Assessment Blood filled blister 08/25/22 1600   Drainage Amount None 08/25/22 1600   Wound Odor None 08/25/22 1600   Lolita-Wound/Incision Assessment Fragile 08/25/22 1600   Number of days: 1       Wound Ankle Left;Medial 08/25/22 (Active)   Wound Image   08/25/22 1600   Wound Etiology Pressure Unstageable 08/25/22 1600   Dressing Status Clean;Dry; Intact 08/26/22 0600   Dressing/Treatment Foam 08/26/22 0600   Wound Length (cm) 1 cm 08/25/22 1600   Wound Width (cm) 1.5 cm 08/25/22 1600   Wound Surface Area (cm^2) 1.5 cm^2 08/25/22 1600   Wound Assessment Eschar dry 08/25/22 1600   Drainage Amount None 08/25/22 1600   Wound Odor None 08/25/22 1600   Lolita-Wound/Incision Assessment Fragile 08/25/22 1600   Edges Defined edges 08/25/22 1600   Number of days: 1       Wound Toe (Comment  which one) Left great toe (Active)   Wound Image   08/25/22 1600   Wound Etiology Pressure Stage 2 08/25/22 1600   Dressing Status Clean;Dry; Intact 08/26/22 0600   Dressing/Treatment Foam 08/26/22 0600   Wound Length (cm) 2 cm 08/25/22 1600   Wound Width (cm) 1.7 cm 08/25/22 1600   Wound Surface Area (cm^2) 3.4 cm^2 08/25/22 1600   Wound Assessment Blood filled blister 08/25/22 1600   Drainage Amount None 08/25/22 1600   Wound Odor None 08/25/22 1600   Lolita-Wound/Incision Assessment Fragile 08/25/22 1600   Number of days: 1       Wound Foot Left;Medial 1st MPJ 08/25/22 (Active)   Wound Image   08/25/22 1600   Wound Etiology Deep Tissue/Injury 08/25/22 1600   Dressing Status Clean;Dry; Intact 08/26/22 0600   Dressing/Treatment Foam 08/26/22 0600   Wound Length (cm) 4.2 cm 08/25/22 1600   Wound Width (cm) 4 cm 08/25/22 1600   Wound Surface Area (cm^2) 16.8 cm^2 08/25/22 1600   Wound Assessment Purple/maroon 08/25/22 1600   Drainage Amount None 08/25/22 1600   Wound Odor None 08/25/22 1600   Lolita-Wound/Incision Assessment Fragile 08/25/22 1600   Number of days: 1       Wound Hip Right;Lateral (Active)   Wound Image   08/25/22 1600   Wound Etiology Deep Tissue/Injury 08/25/22 1600   Dressing Status Clean;Dry; Intact 08/26/22 0600   Dressing/Treatment Foam 08/26/22 0600   Wound Length (cm) 2.5 cm 08/25/22 1600   Wound Width (cm) 2.5 cm 08/25/22 1600   Wound Surface Area (cm^2) 6.25 cm^2 08/25/22 1600   Wound Assessment Purple/maroon 08/25/22 1600   Drainage Amount None 08/25/22 1600   Wound Odor None 08/25/22 1600   Number of days: 1       Wound Pretibial Right 08/25/22 (Active)   Wound Image   08/25/22 1600   Wound Etiology Pressure Stage 2 08/25/22 1600   Dressing Status Clean;Dry; Intact 08/26/22 0600   Dressing/Treatment Foam 08/26/22 0600   Wound Length (cm) 2.5 cm 08/25/22 1600   Wound Width (cm) 4 cm 08/25/22 1600   Wound Depth (cm) 0.1 cm 08/25/22 1600   Wound Surface Area (cm^2) 10 cm^2 08/25/22 1600   Wound Volume (cm^3) 1 cm^3 08/25/22 1600   Wound Assessment Pale granulation tissue 08/25/22 1600   Drainage Amount Scant 08/25/22 1600   Drainage Description Serosanguinous 08/25/22 1600   Wound Odor None 08/25/22 1600   Lolita-Wound/Incision Assessment Fragile 08/25/22 1600   Number of days: 1       Wound Leg lower Right;Medial right medial calf 08/25/22 (Active)   Wound Image   08/25/22 1600   Wound Etiology Pressure Stage 2 08/25/22 1600   Dressing Status Clean;Dry; Intact 08/26/22 0600   Dressing/Treatment Foam 08/26/22 0600   Wound Length (cm) 4 cm 08/25/22 1600   Wound Width (cm) 1.5 cm 08/25/22 1600   Wound Depth (cm) 0.1 cm 08/25/22 1600   Wound Surface Area (cm^2) 6 cm^2 08/25/22 1600   Wound Volume (cm^3) 0.6 cm^3 08/25/22 1600   Wound Assessment Pale granulation tissue 08/25/22 1600   Drainage Amount Scant 08/25/22 1600   Drainage Description Serosanguinous 08/25/22 1600   Wound Odor None 08/25/22 1600   Lolita-Wound/Incision Assessment Fragile 08/25/22 1600   Number of days: 1       Wound Heel Right (Active)   Wound Image   08/25/22 1600   Wound Etiology Pressure Stage 2 08/25/22 1600   Dressing Status Clean;Dry; Intact 08/26/22 0600   Dressing/Treatment Foam 08/26/22 0600   Wound Length (cm) 6 cm 08/25/22 1600   Wound Width (cm) 7 cm 08/25/22 1600   Wound Surface Area (cm^2) 42 cm^2 08/25/22 1600   Wound Assessment Fluid filled blister 08/25/22 1600   Drainage Amount None 08/25/22 1600   Wound Odor None 08/25/22 1600   Lolita-Wound/Incision Assessment Fragile; Non-Blanchable erythema 08/25/22 1600   Number of days: 1       Wound Sacral/coccyx (Active)   Wound Image   08/25/22 1600   Wound Etiology Deep Tissue/Injury 08/25/22 1600   Dressing Status Clean;Dry; Intact 08/26/22 0600   Dressing/Treatment Foam 08/26/22 0600   Wound Length (cm) 4 cm 08/25/22 1600   Wound Width (cm) 1 cm 08/25/22 1600   Wound Surface Area (cm^2) 4 cm^2 08/25/22 1600   Wound Assessment Purple/maroon 08/25/22 1600   Drainage Amount None 08/25/22 1600   Wound Odor None 08/25/22 1600   Lolita-Wound/Incision Assessment Fragile 08/25/22 1600   Number of days: 1       Wound Hip Left;Posterior (Active)   Wound Image   08/25/22 1600   Wound Etiology Pressure Unstageable 08/25/22 1600   Dressing Status Clean;Dry; Intact 08/26/22 0600   Dressing/Treatment Foam 08/26/22 0600   Wound Length (cm) 11 cm 08/25/22 1600   Wound Width (cm) 8.5 cm 08/25/22 1600   Wound Surface Area (cm^2) 93.5 cm^2 08/25/22 1600   Wound Assessment Eschar dry;Slough;Pale granulation tissue 08/25/22 1600   Drainage Amount Scant 08/25/22 1600   Drainage Description Serosanguinous 08/25/22 1600   Wound Odor None 08/25/22 1600   Lolita-Wound/Incision Assessment Fragile; Intact 08/25/22 1600   Edges Defined edges 08/25/22 1600   Number of days: 1          PLAN     Skin Care & Pressure Relief Recommendations  Minimize layers of linen  Pads under patient to optimize support surface  Turn/reposition approximately every 2 hours  Pillow wedges  Manage incontinence   Promote continence; Skin Protective lotion/cream to buttocks and sacrum daily and as needed with incontinence care  Offloading boots    Recommendations: protective borders.  Santyl to left hip wound if not made palitive    Teaching completed with:   [] Patient           [] Family member       [] Caregiver          [x] Nursing  [] Other    Patient/Caregiver Teaching:  Level of patient/caregiver understanding able to:   [x] Indicates understanding       [] Needs reinforcement  [] Unsuccessful      [] Verbal Understanding  [] Demonstrated understanding       [] No evidence of learning  [] Refused teaching         [] N/A       Electronically signed by Bassem Gonzalez RN on 8/26/2022 at 10:36 AM

## 2022-08-26 NOTE — PROGRESS NOTES
Physician Progress Note      Dionicio Wallis  [facility] #:                  [id number]  :                       1950  ADMIT DATE:       2022 9:36 AM  DISCH DATE:  RESPONDING  PROVIDER #:        Yessenia Roper MD          QUERY TEXT:    Patient admitted with sepsis. Per wound care note, noted to also have multiple pressure ulcer. If possible, please document in progress notes and discharge summary the location, present on admission status and stage of the pressure ulcer:     The medical record reflects the following:  Risk Factors: chronic pressure, decreased mobility, shear force, incontinence of stool, malnutrition and poor hygiene    Clinical Indicators:  2022, wound care, Jack Krause RN  right anterior knee, multiple deep tissue injury  left anterior knee, multiple deep tissue injury  right lateral foot, deep tissue injury  right lateral fifth toe, stage 2 pressure injury  right medial foot, 1st MPJ, deep tissue injury  right medial foot, 1st toe, Stage 2 pressure injury  left lateral fifth toe, deep tissue injury  left lateral foot, lateral 5th MPJ, deep tissue injury  left lateral ankle, deep tissue injury  left heel, stage 2 pressure injury  left medial ankle, Unstageable pressure injury  left great toe stage 2 pressure injury  left medial foot, 1st MPJ, deep tissue injury  right hip deep tissue injury  right pretibial are stage 2 pressure injury  right medial calf, stage 2 pressure injury  right heel, stage 2 pressure injury  coccyx, deep tissue injury  left hip, Unstageable pressure injury    Treatment: foam dressing    Stage 1:  Non-blanchable erythema of intact skin  Stage 2:  Abrasion, Blister, Partial-thickness skin loss, with exposed dermis  Stage 3:  Full-thickness skin loss with damage or necrosis of subcutaneous tissue  Stage 4:  Full-thickness skin & soft tissue loss through to underlying muscle, tendon or bone  Unstageable: Obscured full-thickness skin & tissue loss    Thank you,  RHONA Sommers RN, CDS  [email or web address]  Options provided:  -- Stage 2 Pressure injury of right lateral fifth toe  stage 2 pressure injury of right medial foot , 1st toe  stage 2 pressure injury of left heel  stage 2 pressure injury of left great toe  stage 2 pressure injury of right pretibial area  stage 2 pressure injury of right medial calf  stage 2 pressure injury of right heel  deep tissue injury of right anterior knee  deep tissue injury of left anterior knee  deep tissue injury of right lateral foot  deep tissue injury of right medial foot  deep tissue injury of left lateral fifth toe  deep tissue injury of left lateral foot, lateral 5th MPJ  deep tissue injury left lateral ankle  deep tissue injury left medial foot, 1st MPJ  deep tissue injury of right hip  deep tissue injury of coccyx  Unstageable pressure injury of left medial ankle  Unstageable pressure injury of left hip all present on admission  -- Other - I will add my own diagnosis  -- Disagree - Not applicable / Not valid  -- Disagree - Clinically unable to determine / Unknown  -- Refer to Clinical Documentation Reviewer    PROVIDER RESPONSE TEXT:    This patient has a Stage 2 Pressure injury of right lateral fifth toe stage 2 pressure injury of right medial foot , 1st toe stage 2 pressure injury of left heel stage 2 pressure injury of left great toe stage 2 pressure injury of right pretibial area stage 2 pressure injury of right medial calf stage 2 pressure injury of right heel deep tissue injury of right anterior knee deep tissue injury of left anterior knee deep tissue injury of right lateral foot deep tissue injury of right medial foot deep tissue injury of left lateral fifth toe deep tissue injury of left lateral foot, lateral 5th MPJ deep tissue injury left lateral ankle deep tissue injury left medial foot, 1st MPJ deep tissue injury of right hip deep tissue injury of coccyx Unstageable pressure injury of left medial ankle Unstageable pressure injury of left hip which are all present on admission. Query created by:  Tracie Hahn on 8/26/2022 11:51 AM      Electronically signed by:  Magaly Smith MD 8/26/2022 5:30 PM

## 2022-08-26 NOTE — PROGRESS NOTES
conducted a Follow up consultation and Spiritual Assessment for Polina Esquivel, who is a 71 y.o.,male. The  provided the following Interventions:  Patient is vented and unavailable.  spoke with [employer] this afternoon who called to let us know to contact [facility] should the patient die when he is extubated We will call the [facility] at [phone]  Chart reviewed. Assessment:  There are no further spiritual or Adventism issues which require Spiritual Care Services interventions at this time. Plan:  Chaplains will continue to follow and will provide pastoral care on an as needed/requested basis.  recommends bedside caregivers page  on duty if patient shows signs of acute spiritual or emotional distress. Rev.  Wood Vitale,Certified [employer] [facility]   Coordinator [place]  [phone]

## 2022-08-26 NOTE — CONSULTS
Palliative Medicine Consult    Patient Name: Tl Torrez  YOB: 1950    Date of Initial Consult: 8/26/2022  Reason for Consult: Goals of care discussions  Requesting Provider: Dr. Federico Frances   Primary Care Physician: Sean May MD      SUMMARY:   Tl Torrez is a 71 y.o. with a past history of cirrhosis, CKD, HTN, cardiomyopathy, mitral stenosis, and COVID 23 in June 2022 , who was admitted on 8/25/2022 from [place] [address] with a diagnosis of acute respiratory failure with hypoxemia currently intubated, septic shock, acute metabolic encephalopathy, HCAP, GNR bacteremia, acute renal failure, cardiomyopathy, cachexia, anemia, severe malnutrition, and bedbound state. Current medical issues leading to Palliative Medicine involvement include: Goals of care discussions. PALLIATIVE DIAGNOSES:   Goals of care discussions  Septic shock  Acute renal failure  Acute respiratory failure with hypoxemia  Cachexia/debility/severe malnutrition       PLAN:   Goals of care discussions: Palliative medicine team including  PATRICK Ring and I met with patient at patient's bedside. Patient is orally intubated, mildly sedated, on a mechanical ventilator, appears cachectic, critically ill, and in no acute distress. Patient does not respond to verbal or tactile stimuli. Patient has a legal guardian with [facility], Brandin King. Called Ms. Aaron Garay to address goals of care. Patient appears terminally ill. Medical update provided, including septic shock, with HCAP and GNR bacteremia, on 3 vasopressors for hypotension, acute renal failure with creatinine 4.5, history of cirrhosis, multiple pressure ulcers, cachexia, hypothermic on arrival with temperature 95.3, respiratory failure requiring emergent intubation on 8/25/2022, cardiomyopathy with estimated EF of 50 to 55% on most recent echo 8/25/2022. Discussed with Ms. Posey overall poor prognosis, with high risk of clinical deterioration despite aggressive medical management. Discussed the benefits and burdens of CPR in the event of cardiopulmonary arrest in the setting of multiple chronic comorbidities and debility. Discussed the benefits and burdens of continuing aggressive measures versus implementing comfort measures with compassionate extubation and initiation of comfort medications, and discontinuation of all medications, treatments, labs, and therapies not intended for patient comfort. Encouraged consideration of DNR/DNI, and implementation of comfort measures with compassionate extubation in light of advanced age with chronic comorbidities, poor quality of life, and overall poor prognosis. Per Ms. Candida Ramos, she will take this information to the medical board for review. With intensivist permission, sent intensivist note along with my note over to [facility] for medical board review. Awaiting decision from [facility]. At this time, patient is a full code with full interventions. Addendum 2022 1122AM: Called Ms. Candida Ramos to confirm receipt of fax including intensivist note along with my note with medical considerations/recommendations. Ms. Candida Ramos states that medical decisions will have to come from her supervisor, and they are also making attempts at reaching family, and at best they may have a decision on 2022 regarding goals of care. At this time, patient remains a full code with full interventions. Addendum 2022 1500 PM: Call received from [address] [place] [address] who states the medical board agrees with DNR/DNI, comfort measures with compassionate extubation, and no feeding tubes. POST form signed to reflect these goals. Discussed with intensivist, hospitalist, and RN. Comfort orders placed. Per Ms. Candida Ramos, the  home with [facility] in [place],  [facility].  Notified  of this request.     Septic shock: Patient is on multiple pressors, Levophed, epinephrine, and vasopressin. He is on broad-spectrum IV antibiotic therapy. Pancultures obtained. Patient cachectic with multiple pressure ulcers. Wound care consulted. Acute renal failure: Monitoring renal function and urine output at this time. Acute respiratory failure with hypoxemia: Required emergent intubation on 8/25/2022 for impending respiratory failure. CT chest consistent with multifocal pneumonia. Overall poor prognosis, patient appears terminally ill, he is high risk for clinical decompensation with multiple organ involvement. Cachexia/debility/severe malnutrition: Patient is significantly underweight, BMI 14.18, albumin 1.2 on admission. He is significantly malnourished, cachectic, with multiple pressure injuries. Unclear if his mental status at baseline, but at this point he is unresponsive to verbal and tactile stimuli. He needs total assist with all ADLs. Initial consult note routed to primary continuity provider  Communicated plan of care with: Palliative IDT       GOALS OF CARE / TREATMENT PREFERENCES:   [====Goals of Care====]  GOALS OF CARE:Full code with full interventions         TREATMENT PREFERENCES:   Code Status: Full Code    Advance Care Planning:  No flowsheet data found.                  The palliative care team has discussed with patient / health care proxy about goals of care / treatment preferences for patient.  [====Goals of Care====]         HISTORY:     History obtained from: patient's chart    CHIEF COMPLAINT: respiratory failure, shock, hypotension, hypothermia    HPI/SUBJECTIVE:    The patient is:   [] Verbal and participatory  [x] Non-participatory due to:   Orally intubated, unresponsive, mildly sedated     Clinical Pain Assessment (nonverbal scale for severity on nonverbal patients):   Clinical Pain Assessment  Severity: 0    Adult Nonverbal Pain Scale  Face: No particular expression or smile  Activity (Movement): Lying quietly, normal position  Guarding: Lying quietly, no positioning of hands over areas of body  Physiology (Vital Signs): Stable vital signs  Respiratory: Baseline RR/SpO2 compliant with ventilator  Total Score: 0       FUNCTIONAL ASSESSMENT:     Palliative Performance Scale (PPS):  PPS: 20       PSYCHOSOCIAL/SPIRITUAL SCREENING:     Advance Care Planning:  No flowsheet data found. Any spiritual / Faith concerns: unable to assess  [] Yes /  [] No    Caregiver Burnout:  [] Yes /  [] No /  [x] No Caregiver Present      Anticipatory grief assessment: unable to assess  [] Normal  / [] Maladaptive            REVIEW OF SYSTEMS:     Positive and pertinent negative findings in ROS are noted above in HPI. The following systems were [] reviewed / [x] unable to be reviewed as noted in HPI  Other findings are noted below. Systems: constitutional, ears/nose/mouth/throat, respiratory, gastrointestinal, genitourinary, musculoskeletal, integumentary, neurologic, psychiatric, endocrine. Positive findings noted below. Modified ESAS Completed by: provider           Pain: 0           Dyspnea: 0                    PHYSICAL EXAM:     From RN flowsheet:  Wt Readings from Last 3 Encounters:   08/25/22 43.5 kg (96 lb)     Blood pressure (!) 131/55, pulse 82, temperature 98.2 °F (36.8 °C), resp. rate 20, height 5' 9\" (1.753 m), weight 43.5 kg (96 lb), SpO2 96 %.     Pain Scale 1: Adult Nonverbal Pain Scale                      Constitutional: Orally intubated and mildly sedated, no response to verbal or tactile stimuli, appears cachectic and frail   Eyes: pupils equal, anicteric  ENMT: no nasal discharge, moist mucous membranes, orally intubated  Cardiovascular: regular rhythm, distal pulses intact, BLE edema  Respiratory: orally intubated on mechanical ventilator   Gastrointestinal: soft    Musculoskeletal: no deformity, no tenderness to palpation  Skin: decub ulcers and bilateral leg/heel ulcers - see wound care documentation  Neurologic: unresponsive, mildly sedated HISTORY:     Principal Problem:    Acute respiratory failure with hypoxia ([address] (8/25/2022)    Active Problems:    Shock ([address] (8/25/2022)      HCAP (healthcare-associated pneumonia) (8/25/2022)      Acute renal failure (ARF) ([address] (8/25/2022)      Hyperglycemia (8/25/2022)      Hypoxia (8/25/2022)      Sepsis ([address] (8/25/2022)      Unresponsive (8/25/2022)      Diabetes mellitus with hyperglycemia ([address] (8/25/2022)      Bedbound (8/25/2022)      Cachexia ([address] (8/25/2022)      Severe protein-calorie malnutrition ([address] (8/25/2022)      Cardiomyopathy ([address] (8/25/2022)      Pressure ulcer of sacral region (8/25/2022)      Pressure injury of contiguous region involving back and left hip, unstageable ([address] (8/25/2022)      Pressure ulcer of contiguous region involving back and right hip (8/25/2022)      Gram-negative bacteremia (8/26/2022)    Past Medical History:   Diagnosis Date    Chronic kidney disease     Cirrhosis of liver ([address]     Decubitus skin ulcer     Diabetes ([address]     HTN (hypertension)     Muscle weakness     Polyneuropathy       History reviewed. No pertinent surgical history. History reviewed. No pertinent family history. History reviewed, no pertinent family history.   Social History     Tobacco Use    Smoking status: Not on file    Smokeless tobacco: Not on file   Substance Use Topics    Alcohol use: Not on file     No Known Allergies   Current Facility-Administered Medications   Medication Dose Route Frequency    insulin glargine (LANTUS) injection 8 Units  8 Units SubCUTAneous DAILY    sodium zirconium cyclosilicate (LOKELMA) powder packet 10 g  10 g Oral NOW    EPINEPHrine (ADRENALIN) 4 mg in 0.9% sodium chloride 250 mL infusion  1-10 mcg/min IntraVENous TITRATE    midazolam in normal saline (VERSED) 1 mg/mL infusion  0-10 mg/hr IntraVENous TITRATE    fentaNYL (PF) 900 mcg/30 ml infusion soln  0-200 mcg/hr IntraVENous TITRATE    Vancomycin - Pharmacy to Dose  1 Each Other Rx Dosing/Monitoring    albumin human 25% (BUMINATE) solution 12.5 g  12.5 g IntraVENous Q6H    0.9% sodium chloride infusion  75 mL/hr IntraVENous CONTINUOUS    NOREPINephrine (LEVOPHED) 8 mg in 5% dextrose 250mL (32 mcg/mL) infusion  2-16 mcg/min IntraVENous TITRATE    Vancomycin Random level due 8/26/22 at 13:00  1 Each Other ONCE    chlorhexidine (PERIDEX) 0.12 % mouthwash 10 mL  10 mL Oral Q12H    insulin lispro (HUMALOG) injection   SubCUTAneous Q6H    glucose chewable tablet 16 g  4 Tablet Oral PRN    glucagon (GLUCAGEN) injection 1 mg  1 mg IntraMUSCular PRN    dextrose 10% infusion 0-250 mL  0-250 mL IntraVENous PRN    vasopressin (VASOSTRICT) 20 Units in 0.9% sodium chloride 100 mL infusion  0-0.04 Units/min IntraVENous TITRATE    [START ON 8/27/2022] levoFLOXacin (LEVAQUIN) 500 mg in D5W IVPB  500 mg IntraVENous Q48H    piperacillin-tazobactam (ZOSYN) 4.5 g in 0.9% sodium chloride (MBP/ADV) 100 mL MBP  4.5 g IntraVENous Q12H          LAB AND IMAGING FINDINGS:     Lab Results   Component Value Date/Time    WBC 12.1 08/26/2022 05:00 AM    HGB 8.5 (L) 08/26/2022 05:00 AM    PLATELET 815 69/83/0276 05:00 AM     Lab Results   Component Value Date/Time    Sodium 138 08/26/2022 05:00 AM    Potassium 5.2 08/26/2022 05:00 AM    Chloride 107 08/26/2022 05:00 AM    CO2 18 (L) 08/26/2022 05:00 AM     (H) 08/26/2022 05:00 AM    Creatinine 4.50 (H) 08/26/2022 05:00 AM    Calcium 8.1 (L) 08/26/2022 05:00 AM    Magnesium 2.2 08/26/2022 05:00 AM    Phosphorus 6.7 (H) 08/26/2022 05:00 AM      Lab Results   Component Value Date/Time    Alk.  phosphatase 122 (H) 08/26/2022 05:00 AM    Protein, total 6.0 (L) 08/26/2022 05:00 AM    Albumin 1.8 (L) 08/26/2022 05:00 AM    Globulin 4.2 (H) 08/26/2022 05:00 AM     Lab Results   Component Value Date/Time    INR 1.3 (H) 08/25/2022 12:10 PM    Prothrombin time 16.5 (H) 08/25/2022 12:10 PM    aPTT 33.0 08/25/2022 12:10 PM      No results found for: IRON, FE, TIBC, IBCT, PSAT, FERR No results found for: PH, PCO2, PO2  No components found for: GLPOC   No results found for: CPK, CKMB             Total time: 30 minutes  Counseling / coordination time, spent as noted above:   > 50% counseling / coordination?: yes, patient, guardian, and medical team    Prolonged service was provided for  []30 min   []75 min in face to face time in the presence of the patient, spent as noted above.   Time Start:   Time End:

## 2022-08-26 NOTE — ACP (ADVANCE CARE PLANNING)
Advance Care Planning     General Advance Care Planning (ACP) Conversation      Date of Conversation: 8/26/2022    Conducted with: Patient's guardian (Olen Sandhoff), Pattie Heller NP (Palliative) and this writer. Healthcare Decision Maker:     Patient has a court appointed guardian ([address] The assigned guardian is Olen Sandhoff, PO#[phone] and F-RE#[phone]). Content/Action Overview: This writer, along with Pattie Heller NP, with the Palliative Care team; visited with patient today to offer support. Patient is currently intubated (PEEP 5, FiO2 50) and sedated. There was no family or guardian, at the bedside. The Palliative Care team then phoned patient's guardian (Olen Sandhoff) to discuss goals of care moving forward. Patient is a long term resident at [facility]. Patient's guardian was given detailed medical updates and given patient's prognosis. The guardian was informed that it is the recommendation to compassionately extubate patient and transition him to comfort measures. The guardian has to take this information and supporting medical record documents, to the medical board of [address] [place] [address]. The Palliative Care team then faxed Audi Ramos the requested medical documents; along with a POST form for comfort. Audi Ramos will forward the documents to the medical director and board. The Palliative Care team will closely follow up with the guardian for an expedited decision from the [address] [place] [address] medical board. At this time, patient will remain a FULL CODE WITH FULL INTERVENTIONS. Length of Voluntary ACP Conversation in minutes:  25 minutes        Ruddy Maki., [facility]  Palliative Care   #[phone]

## 2022-08-26 NOTE — PROGRESS NOTES
1915- Bedside and Verbal shift change report given to Tammy Aguilar RN (oncoming nurse) by Wes Rowell RN (offgoing nurse). Report included the following information SBAR, Kardex, ED Summary, Intake/Output, MAR, Recent Results, Med Rec Status, and Cardiac Rhythm NSR/ Sinus Tach . 2000- Shift assessment complete. Pt remains intubated and sedated and is meeting goal RASS. Pt BP stable on three pressors; O2 sat remains above 95% on 100%% FiO2. Enzo hugger removed at this time due to esophageal temp 100.4F    2150- Lab reported positive blood cultures; hospitalist paged to report critical result. 5- Hospitalist on unit; critical value reported with read back, MD made aware of pt current antibiotic treatment and no new orders for antibiotics received, new orders received to repeat blood cultures now. 0000- Reassessment completed. No chance to pt condition. 0110- Lab reported positive blood cultures, MD already aware no new orders received. 0400- Reassessment completed, no change to pt condition. 0500- Enzo hugger reapplied at this time due to esophageal temp 96.6F    0700- Bedside and Verbal shift change report given to Bret Polo RN (oncoming nurse) by Tammy Aguilar RN (offgoing nurse). Report included the following information SBAR, Kardex, ED Summary, Intake/Output, MAR, Recent Results, Med Rec Status, and Cardiac Rhythm NSR/ Sinus Tach .

## 2022-08-26 NOTE — DIABETES MGMT
Diabetes/ Glycemic Control Plan of Care  Recommendations/Assessment: Patient has a known history of diabetes, HgbA1c not within target range. Patient is intubated and sedated in the ICU. Per RD - plan to start trickle feeds. Patient has utilized 22 units corrective insulin in the previous 24 hours. Recommend to initiate basal insulin. DX:   1. Abnormal EKG  ECHO ADULT COMPLETE        Recent Glucose Results:   Lab Results   Component Value Date/Time     (H) 08/26/2022 05:00 AM     (HH) 08/25/2022 12:10 PM    GLUCPOC 204 (H) 08/26/2022 05:16 AM    GLUCPOC 287 (H) 08/25/2022 11:30 PM    GLUCPOC 386 (H) 08/25/2022 04:58 PM        BG within target range (non-ICU: <180; -180):  [] Yes    [x] No   Current insulin orders: corrective Humalog Q6 hours  Total Daily Dose previous 24 hours = 22 units Humalog    Current A1c:   Lab Results   Component Value Date/Time    Hemoglobin A1c 10.3 (H) 08/25/2022 12:10 PM      equivalent  to ave Blood Glucose of 249 mg/dl for 2-3 months prior to admission  Adequate glycemic control PTA: [] Yes    [x] No   Nutrition/Diet: DIET NPO     Home diabetes medications:   Key Antihyperglycemic Medications       Patient is on no antihyperglycemic meds.           Plan/Goals:   Blood glucose will be within target of 70 - 180 mg/dl within 72 hours         Education:  [] Refer to Diabetes Education Record                       [x] Education not indicated at this time     Samantha Ortiz RD  Glycemic Control Team  [phone]    Monday-Friday   9 am - 3 pm

## 2022-08-26 NOTE — PROGRESS NOTES
Patient is comfort care. Extubated today.   Cardiology consult cancelled by Emelia Stains as patient is comfort care

## 2022-08-26 NOTE — ACP (ADVANCE CARE PLANNING)
[facility] [address] POST (Physician Orders for Scope of Treatment)       Date of conversation: 8/26/2022  Location: THE [facility]                               Length (minutes): 30    Participants:       [x] Healthcare agent (already designated in existing ACP document)      Name: Berta Glez    Relationship to Patient: [address]    Phone number: [phone]                 Other persons present:                  Name: Leonard Perez NP (Palliative)                           Name: Guilherme Streeter. Edna Pearson. PATRICK (Palliative)           Advanced [facility] ACP Facilitator: Guilherme Pearson. PATRICK and Leonard Perez NP      Conversation Topics   (If Patient does not have decision making capacity, Agent/Surrogate responds based on understanding of how patient would respond if capable)      Understanding of Medical Condition(s) AND Potential Complications:    Healthcare Agent/Other Surrogate: Izzy has full understanding of the patient's medical condition(s) and the potential complications. They are in agreement that patient needs to be compassionately extubated and transitioned to comfort measures. Cardiopulmonary Resuscitation      \"What do you understand about CPR? \" Response: Izzy has full understanding of the risks and burdens of CPR. They agree that patient should not be subjected to any attempts at resuscitation, in the event that his heart stops. They also agree to compassionately extubate and transition patient to comfort measures. A POST was completed, with CHIKI. They returned the POST to the Palliative Care team. Patient's comfort orders have been placed and the team made aware of the plan to compassionately extubate and transition to comfort measures. At this time, patient is a DNR/DNI with Comfort Measures and NO feeding tubes.     Order Elected for CPR:  []  Attempt Resuscitation [x]  Do Not Attempt Resuscitation      When NOT in Cardiopulmonary Arrest, Order Elected:      [x] Comfort Measures after compassionate extubation. [] Limited Additional Interventions  [] Full Interventions    Artificially Administered Nutrition, Order Elected:    [x] No Feeding Tube   [] Feeding Tube for a defined trial period  [] Feeding Tube long-term if indicated      The following was provided (check all that apply):      Healthcare Decision Information Cards:   [] Help with Breathing Facts   [] Tube Feeding Facts   [] CPR Facts               [] Review of existing Advance Directive              [] Assistance with Completion of New Advance Directive               [x] Review of [place] POST Form         Meeting Outcomes:     [] ACP discussion completed   [x] [facility] form completed              [x] [facility] prepared for Provider review and signature   [x] Original placed on Chart, if in facility (form to be sent with patient at discharge)              [x] Copy given to healthcare agent    [x] Copy placed on the chart to be scanned to electronic medical record      If ACP discussion not completed, last interview topic discussed: POST completion and compassionate extubation. Follow-up plan:       [] Schedule follow-up conversation to continue planning   [] Referred individual to Provider for additional questions/concerns               [x] Advised patient/agent/surrogate to review completed POST form and update if needed with changes in condition, patient preferences or care setting       Recommendations: Compassionately extubate and transition to comfort measures. [x] This note routed to one or more involved healthcare providers          Ruddy Ludwig [facility]  Palliative Medicine Inpatient   #[phone]

## 2022-08-26 NOTE — PROGRESS NOTES
Reason for Admission:   Sepsis ([facility] [A41.9]  Shock ([facility] [R57.9]  Unresponsive [R41.89]  Hypoxia [R09.02]  Hyperglycemia [R73.9]  Renal failure [N19]  Pneumonia [J18.9]    PCP: Roxie Walker MD    There are currently no Active Isolations  No active infections                RUR Score:     17             Resources/supports,as identified by patient/family:         Barriers to discharge:none             Plan for utilizing home health:    no                          Likelihood of readmission:            Transition of Care Plan:     Pt lives at [address] [place] [address]               Initial assessment completed with bedside nurse and medical chart. . Cognitive status : sedated on vent    Face sheet information confirmed:  yes. The patient designates  Ilan Ferro  to participate in his discharge plan and to receive any needed information. This patient lives in a SNF . Patient is not able to navigate steps as needed. Prior to hospitalization, patient was considered to be independent with ADLs/IADLS : no . If not independent,  patient needs assist with : dressing, bathing, food preparation, cooking, toileting, grooming, and self feeding      The Patient will require medical transportation if discharged . Keila Kendall Patient has stayed in a skilled nursing facility or rehab. Was  stay within last 60 days : yes. This patient is on dialysis/days :no      . The patient states that he can obtain his medications from the pharmacy, and take his medications as directed. Patient's current insurance is Payor: [facility] MEDICARE / Plan: [facility] COMMUNITY PLAN MCR / Product Type: Managed Care Medicare /        Care Management Interventions  PCP Verified by CM:  Yes  Palliative Care Criteria Met (RRAT>21 & CHF Dx)?: No  Transition of Care Consult (CM Consult): [facility]: Lopez Tsai (Comment) ([place])  Discharge Location  Patient Expects to be Discharged to[de-identified] Skilled nursing facility (pt on vent)

## 2022-08-27 PROBLEM — Z51.5 COMFORT MEASURES ONLY STATUS: Status: ACTIVE | Noted: 2022-01-01

## 2022-08-27 NOTE — ROUTINE PROCESS
TRANSFER - OUT REPORT:    Verbal report given to TELMA Roa RN(name) on Susannah Roberts  being transferred to [address] [place] [address](unit) for routine progression of care       Report consisted of patients Situation, Background, Assessment and   Recommendations(SBAR). Information from the following report(s) SBAR, Kardex, ED Summary, Intake/Output, MAR, Recent Results, Med Rec Status, and Cardiac Rhythm NSR/ Sinus Tach  was reviewed with the receiving nurse. Lines:   Triple Lumen 08/25/22 Right Neck (Active)   Central Line Being Utilized No 08/26/22 2000   Criteria for Appropriate Use Hemodynamically unstable, requiring monitoring lines, vasopressors, or volume resuscitation 08/26/22 2000   Site Assessment Clean, dry, & intact 08/26/22 2000   Infiltration Assessment 0 08/26/22 2000   Affected Extremity/Extremities Color distal to insertion site pink (or appropriate for race) 08/26/22 2000   Date of Last Dressing Change 08/25/22 08/26/22 2000   Dressing Status Clean, dry, & intact 08/26/22 2000   Dressing Type Disk with Chlorhexadine gluconate (CHG); Transparent 08/26/22 2000   Action Taken Open ports on tubing capped 08/26/22 2000   Proximal Hub Color/Line Status White;Patent; Flushed;Capped 08/26/22 2000   Positive Blood Return (Medial Site) Yes 08/26/22 2000   Medial Hub Color/Line Status Blue;Patent; Flushed;Capped 08/26/22 2000   Positive Blood Return (Lateral Site) Yes 08/26/22 2000   Distal Hub Color/Line Status Brown;Patent; Flushed;Capped 08/26/22 2000   Positive Blood Return (Site #3) Yes 08/26/22 2000   Alcohol Cap Used Yes 08/26/22 2000       Peripheral IV 08/25/22 Right Forearm (Active)   Site Assessment Clean, dry, & intact 08/26/22 2000   Phlebitis Assessment 0 08/26/22 2000   Infiltration Assessment 0 08/26/22 2000   Dressing Status Clean, dry, & intact 08/26/22 2000   Dressing Type Transparent;Tape 08/26/22 2000   Hub Color/Line Status Pink;Patent; Flushed;Capped 08/26/22 2000   Action Taken Open ports on tubing capped 08/26/22 2000   Alcohol Cap Used Yes 08/26/22 2000       Peripheral IV 08/25/22 Right Arm (Active)   Site Assessment Clean, dry, & intact 08/26/22 2000   Phlebitis Assessment 0 08/26/22 2000   Infiltration Assessment 0 08/26/22 2000   Dressing Status Clean, dry, & intact 08/26/22 2000   Dressing Type Transparent 08/26/22 2000   Hub Color/Line Status Pink;Patent; Flushed;Capped 08/26/22 2000   Action Taken Open ports on tubing capped 08/26/22 2000   Alcohol Cap Used Yes 08/26/22 2000        Opportunity for questions and clarification was provided.       Patient transported with:   O2 @ 2 liters  Registered Nurse

## 2022-08-27 NOTE — PROGRESS NOTES
1900- Bedside and Verbal shift change report given to Jan Chacon RN (oncoming nurse) by Corie Leventhal, RN (offgoing nurse). Report included the following information SBAR, Kardex, ED Summary, Intake/Output, MAR, Recent Results, Med Rec Status, and Cardiac Rhythm NSR/ Sinus Tach and comfort care measures only . 2000- Shift assessment completed. Pt is on comfort measures only, pt is noted to be mouth breathing upon assessment; placed on venturi mask at 2L for comfort. Pt is not noted to be in any pain at this time.

## 2022-08-27 NOTE — PROGRESS NOTES
Pulm/CC    S/p compassionate extubation yesterday evening, and transferred out of ICU. Recommend hospice consultation. I would sign off; please call if needed.     Clay Moore MD

## 2022-08-27 NOTE — PROGRESS NOTES
CM noted pt transferred to the floor from ICU. Pt is comfort measures only. CM to continue to follow to assist with d/c needs.

## 2022-08-27 NOTE — HOSPICE
Hospice referral received. Chart review in process. Thank you for the referral to [facility].  If we can be of further assistance please contact [phone], RN  Clinical Manager  [place] [address]., [address] [facility], [facility] [address]  [phone]  Email: [email or web address]

## 2022-08-27 NOTE — PROGRESS NOTES
Received transfer patient from ICU. Patient is in comfort measures. He seam comfortable. He is on 2 L of Venturi  mask.

## 2022-08-27 NOTE — PROGRESS NOTES
conducted a Follow up consultation and Spiritual Assessment for Tami Rowe, who is a 71 y.o.,male.  provided anointing of the sick and said prayers at bedside. The  provided the following Interventions:  Continued the relationship of care and support. Offered prayer and assurance of continued prayer on patients behalf. Facilitated and administered Quaker ritual.  Chart reviewed. The following outcomes were achieved:  Reduction of Isolation     Assessment:  There are no further spiritual or Quaker issues which require Spiritual Care Services interventions at this time. Plan:  Chaplains will continue to follow and will provide pastoral care on an as needed/requested basis.  recommends bedside caregivers page  on duty if patient shows signs of acute spiritual or emotional distress. Rev. Dorethia [place].  Malika Malone, [address] [place] [address] [phone]  Pager :[phone]

## 2022-08-28 NOTE — PROGRESS NOTES
Dr. Deana Willams paged via [address] [place] [address]. Pt has positive blood cultures, Gram negative rods, growing in aerobic bottle. FYI. Thanks    Awaiting reply.

## 2022-08-28 NOTE — PROGRESS NOTES
Hospitalist Progress Note    Patient: Nava Sharp MRN: [id number]  CSN: [id number]    YOB: 1950  Age: 71 y.o. Sex: male    DOA: 8/25/2022 LOS:  LOS: 3 days          Assessment/Plan     Patient Active Problem List   Diagnosis Code    Shock ([address] R57.9    HCAP (healthcare-associated pneumonia) J18.9    Acute renal failure (ARF) ([address] N17.9    Hyperglycemia R73.9    Hypoxia R09.02    Sepsis ([address] A41.9    Unresponsive R41.89    Diabetes mellitus with hyperglycemia ([address] E11.65    Acute respiratory failure with hypoxia (HCC) J96.01    Bedbound Z74.01    Cachexia ([address] R64    Severe protein-calorie malnutrition ([address] E43    Cardiomyopathy ([address] I42.9    Pressure ulcer of sacral region L89.159    Pressure injury of contiguous region involving back and left hip, unstageable (HCC) L89.45    Pressure ulcer of contiguous region involving back and right hip L89.40    Gram-negative bacteremia R78.81    Comfort measures only status Z51.5        Chief complaint :  Unresponsive  71 y.o. male with cirrhosis, CKD, HTN, cardiomyopathy, mitral stenosis, COVID 23 in June 2022 is sent to ER from [facility] with concerns of unresponsiveness. Acute respiratory failure with hypoxia  Multifocal pneumonia  GNR Bacteremia  Multiple pressure ulcers  Septic shock  BEREKET   Hyperkalemia   DM with hyperglycemia  Multiple pressure ulcers     Pt made comfort care and extubated     Palliative care following     No change in clinical condition     Disposition : TBD    S: unresponsive     Physical Exam:  General: As above    HEENT: NC, Atraumatic. PERRLA, anicteric sclerae. Lungs: CTA Bilaterally. No Wheezing/Rhonchi/Rales. Heart:  S1 S2,  No murmur, No Rubs, No Gallops  Abdomen: Soft, Non distended, Non tender. +Bowel sounds,   Extremities: No c/c/e  Multiple pressure ulcers as described above      Vital signs/Intake and Output:  Visit Vitals  BP (!) 141/65 (BP 1 Location: Left upper arm, BP Patient Position:  At rest;Lying)   Pulse 71   Temp 98.7 °F (37.1 °C)   Resp 16   Ht 5' 9\" (1.753 m)   Wt 43.5 kg (96 lb)   SpO2 (P) 96%   BMI 14.18 kg/m²     Current Shift:  08/28 0701 - 08/28 1900  In: -   Out: 150 [Urine:150]  Last three shifts:  08/26 1901 - 08/28 0700  In: 0   Out: 375 [Urine:375]            Labs: Results:       Chemistry Recent Labs     08/26/22 0500   *      K 5.2      CO2 18*   *   CREA 4.50*   CA 8.1*   AGAP 13   BUCR 42*   *   TP 6.0*   ALB 1.8*   GLOB 4.2*   AGRAT 0.4*        CBC w/Diff Recent Labs     08/26/22 0500   WBC 12.1   RBC 3.30*   HGB 8.5*   HCT 25.8*      GRANS 60   LYMPH 3*   EOS 0        Cardiac Enzymes No results for input(s): CPK, CKND1, CAROLYN in the last 72 hours. No lab exists for component: CKRMB, TROIP   Coagulation No results for input(s): PTP, INR, APTT, INREXT, INREXT in the last 72 hours. Lipid Panel No results found for: CHOL, CHOLPOCT, CHOLX, CHLST, CHOLV, 894050, HDL, HDLP, LDL, LDLC, DLDLP, 795084, VLDLC, VLDL, TGLX, TRIGL, TRIGP, TGLPOCT, CHHD, CHHDX   BNP No results for input(s): BNPP in the last 72 hours.    Liver Enzymes Recent Labs     08/26/22  0500   TP 6.0*   ALB 1.8*   *        Thyroid Studies Lab Results   Component Value Date/Time    TSH 1.86 08/25/2022 12:10 PM        Procedures/imaging: see electronic medical records for all procedures/Xrays and details which were not copied into this note but were reviewed prior to creation of Plan

## 2022-08-29 NOTE — PROGRESS NOTES
Palliative Medicine    CODE STATUS: DNR/DNI; comfort measures; no feeding tube    AMD Status: on file naming Arnaud Jones from [facility] as hi court appointed guardian. 8/19/2022 1020 Seen today in room 305 along with Constance Payne NP. Lying in bed with eyes closed. Will pull away from noxious tactile stimulation. Respirations unlabored with oxygen on per venturimask at 2 lpm. Received morphine once yesterday. No evidence of discomfort. Hospice has been consulted. Disposition plan: anticipate he will return to his facility with hospice support. Palliative care will continue to follow Coco Wong  and his family during his hospitalization and support them as they make healthcare decisions and define goals of care.       Candis Alaniz RN, MSN  Palliative Medicine  P: [phone]

## 2022-08-29 NOTE — PROGRESS NOTES
1916 - Assumed care at this time. 1935 - Pt opens eyes to voice. Nonverbal. Respirations labored on Venturi mask 2L. Lung sounds coarse. Multiple pressure ulcers. Mepilex dressings to areas C/D/I. No signs of pain. Morrison draining and patent. Will continue to monitor. 2307 - Pt found unresponsive. Pulseless. Rosemarie Moon 5555 notified. Dr. Juan Chi notified. [facility] paged at this time. [facility] notified at this time.

## 2022-08-29 NOTE — PROGRESS NOTES
NUTRITION update    ASSESSMENT/PLAN:     Current Diet: NPO     Chart reviewed, note change in goals of care now focused on comfort measures only. Aggressive nutrition intervention is not indicated at this time. Will assist as needed; please consult if nutrition intervention is medically indicated and consistent with patient's goals of care.       Stacy Sanchez RD

## 2022-08-29 NOTE — HOSPICE
Called pts guardian and received voice mail. Left message with call back number.      Lavonne CLANCY, [facility] Inpatient Clinical Liaison  [facility] [address] [facility], [facility] [address]  [phone]  Email: [email or web address]

## 2022-08-29 NOTE — DIABETES MGMT
GLYCEMIC CONTROL PROGRESS NOTE:    chart reviewed, note change in goals of care now focused on comfort measures only  -GC interventions not indicated at this time; will assist as needed, please consult if GC intervention is medically indicated and consistent with patient's goals of care     Emerson Villalpando MS, RN, CDE  Glycemic Control Team  [phone]

## 2022-08-29 NOTE — PROGRESS NOTES
Removed triple lumen IJ from right side of patient's neck. No bleeding at site noted. Catheter tip remain intact. Pressure dressing applied to area.

## 2022-08-29 NOTE — PROGRESS NOTES
Patient is non verbal and Palliative at this time.       Sister Eva Torres, [place], [address] [phone]

## 2022-08-29 NOTE — PROGRESS NOTES
Hospitalist Progress Note    Patient: Ameena Campbell MRN: [id number]  CSN: [id number]    YOB: 1950  Age: 71 y.o. Sex: male    DOA: 8/25/2022 LOS:  LOS: 4 days          Assessment/Plan     Patient Active Problem List   Diagnosis Code    Shock ([address] R57.9    HCAP (healthcare-associated pneumonia) J18.9    Acute renal failure (ARF) ([address] N17.9    Hyperglycemia R73.9    Hypoxia R09.02    Sepsis ([address] A41.9    Unresponsive R41.89    Diabetes mellitus with hyperglycemia ([address] E11.65    Acute respiratory failure with hypoxia (HCC) J96.01    Bedbound Z74.01    Cachexia ([address] R64    Severe protein-calorie malnutrition ([address] E43    Cardiomyopathy ([address] I42.9    Pressure ulcer of sacral region L89.159    Pressure injury of contiguous region involving back and left hip, unstageable (HCC) L89.45    Pressure ulcer of contiguous region involving back and right hip L89.40    Gram-negative bacteremia R78.81    Comfort measures only status Z51.5        Chief complaint :  Unresponsive  71 y.o. male with cirrhosis, CKD, HTN, cardiomyopathy, mitral stenosis, COVID 23 in June 2022 is sent to ER from [facility] with concerns of unresponsiveness. Acute respiratory failure with hypoxia  Multifocal pneumonia  GNR Bacteremia  Multiple pressure ulcers  Septic shock  BEREKET   Hyperkalemia   DM with hyperglycemia  Multiple pressure ulcers     Pt made comfort care and extubated     Palliative care following     Hospice consulted    No change in clinical condition     Disposition : TBD    S: slightly more alert today. Turned his head in the direction  of my voice. Still non-verbal     Physical Exam:  General: As above    HEENT: NC, Atraumatic. PERRLA, anicteric sclerae. Lungs: CTA Bilaterally. No Wheezing/Rhonchi/Rales. Heart:  S1 S2,  No murmur, No Rubs, No Gallops  Abdomen: Soft, Non distended, Non tender.   +Bowel sounds,   Extremities: No c/c/e  Multiple pressure ulcers as described above      Vital signs/Intake and Output:  Visit Vitals  /65   Pulse 63   Temp 98.7 °F (37.1 °C)   Resp 22   Ht 5' 9\" (1.753 m)   Wt 43.5 kg (96 lb)   SpO2 96%   BMI 14.18 kg/m²     Current Shift:  No intake/output data recorded. Last three shifts:  08/27 1901 - 08/29 0700  In: -   Out: 150 [Urine:150]            Labs: Results:       Chemistry No results for input(s): GLU, NA, K, CL, CO2, BUN, CREA, CA, AGAP, BUCR, TBIL, AP, TP, ALB, GLOB, AGRAT in the last 72 hours. No lab exists for component: GPT     CBC w/Diff No results for input(s): WBC, RBC, HGB, HCT, PLT, GRANS, LYMPH, EOS, HGBEXT, HCTEXT, PLTEXT, HGBEXT, HCTEXT, PLTEXT in the last 72 hours. Cardiac Enzymes No results for input(s): CPK, CKND1, CAROLYN in the last 72 hours. No lab exists for component: CKRMB, TROIP   Coagulation No results for input(s): PTP, INR, APTT, INREXT, INREXT in the last 72 hours. Lipid Panel No results found for: CHOL, CHOLPOCT, CHOLX, CHLST, CHOLV, 672986, HDL, HDLP, LDL, LDLC, DLDLP, 882208, VLDLC, VLDL, TGLX, TRIGL, TRIGP, TGLPOCT, CHHD, CHHDX   BNP No results for input(s): BNPP in the last 72 hours. Liver Enzymes No results for input(s): TP, ALB, TBIL, AP in the last 72 hours.     No lab exists for component: SGOT, GPT, DBIL     Thyroid Studies Lab Results   Component Value Date/Time    TSH 1.86 08/25/2022 12:10 PM        Procedures/imaging: see electronic medical records for all procedures/Xrays and details which were not copied into this note but were reviewed prior to creation of Plan

## 2022-08-29 NOTE — PROGRESS NOTES
D/c plan: Return to [address] [place] [address] Rd noted that [facility] has reached out to [place] and they left a VM as they are the pt's guardian to discuss hospice at discharge. CM to continue to follow to assist with discharge planning.

## 2022-08-30 NOTE — PROGRESS NOTES
contacted [facility] in [place] per request of  with [employer] as his Sarah Zambrano.  also let Nursing Supervisor know the [facility] had been called. Rev.  Jose Vitale,Certified Respecting Choices Advance Care   Coordinator [place]  [phone]

## 2022-08-30 NOTE — DISCHARGE SUMMARY
Discharge Summary    Patient: Coco Wong MRN: [id number]  CSN: [id number]    YOB: 1950  Age: 71 y.o.   Sex: male    DOA: 8/25/2022 LOS:  LOS: 5 days   Discharge Date:      Primary Care Provider:  Ariadne Rust MD    Admission Diagnoses: Sepsis ([facility] [A41.9]  Shock ([facility] [R57.9]  Unresponsive [R41.89]  Hypoxia [R09.02]  Hyperglycemia [R73.9]  Renal failure [N19]  Pneumonia [J18.9]    Discharge Diagnoses:    Problem List as of 8/30/2022 Never Reviewed            Codes Class Noted - Resolved    Comfort measures only status ICD-10-CM: Z51.5  ICD-9-CM: V66.7  8/27/2022 - Present        Gram-negative bacteremia ICD-10-CM: R78.81  ICD-9-CM: 790.7, 041.85  8/26/2022 - Present        Shock ([facility] ICD-10-CM: R57.9  ICD-9-CM: 785.50  8/25/2022 - Present        HCAP (healthcare-associated pneumonia) ICD-10-CM: J18.9  ICD-9-CM: 707  8/25/2022 - Present        Acute renal failure (ARF) ([facility] ICD-10-CM: N17.9  ICD-9-CM: 584.9  8/25/2022 - Present        Hyperglycemia ICD-10-CM: R73.9  ICD-9-CM: 790.29  8/25/2022 - Present        Hypoxia ICD-10-CM: R09.02  ICD-9-CM: 799.02  8/25/2022 - Present        Sepsis ([facility] ICD-10-CM: A41.9  ICD-9-CM: 038.9, 995.91  8/25/2022 - Present        Unresponsive ICD-10-CM: R41.89  ICD-9-CM: 780.09  8/25/2022 - Present        Diabetes mellitus with hyperglycemia ([facility] ICD-10-CM: E11.65  ICD-9-CM: 250.00  8/25/2022 - Present        * (Principal) Acute respiratory failure with hypoxia ([facility] ICD-10-CM: J96.01  ICD-9-CM: 518.81  8/25/2022 - Present        Bedbound ICD-10-CM: Z74.01  ICD-9-CM: V49.84  8/25/2022 - Present        Cachexia ([facility] ICD-10-CM: R64  ICD-9-CM: 799.4  8/25/2022 - Present        Severe protein-calorie malnutrition ([facility] ICD-10-CM: E43  ICD-9-CM: 262  8/25/2022 - Present        Cardiomyopathy ([facility] ICD-10-CM: I42.9  ICD-9-CM: 425.4  8/25/2022 - Present        Pressure ulcer of sacral region ICD-10-CM: L89.159  ICD-9-CM: 707.03, 707.20  8/25/2022 - Present Pressure injury of contiguous region involving back and left hip, unstageable [facility]) ICD-10-CM: L89.45  ICD-9-CM: 707.09, 707.25  2022 - Present        Pressure ulcer of contiguous region involving back and right hip ICD-10-CM: L89.40  ICD-9-CM: 707.09, 707.20  2022 - Present           Discharge Medications: There are no discharge medications for this patient. Discharge Condition:     Procedures : intubation , central line placement     Consults: Pulmonary/Critical Care, palliative care consult, hospice      PHYSICAL EXAM   Visit Vitals  /73   Pulse 60   Temp (!) 93.4 °F (34.1 °C)   Resp 24   Ht 5' 9\" (1.753 m)   Wt 43.5 kg (96 lb)   SpO2 (!) 83%   BMI 14.18 kg/m²     Death exam revealed absent cardiac sounds x1 minute as well as absent pupillary and corneal reflexes. Admission HPI : Jenifer Holt is a 71 y.o. male with cirrhosis, CKD, HTN, cardiomyopathy, mitral stenosis, COVID 23 in 2022 is sent to ER from [facility] with concerns of unresponsiveness. Patient is orally intubated and sedated. History obtained from chart review. Patient was also noted to be hypotensive. In ER he was noted to be hypothermic with a temperature of 95.3, blood pressure of 70 palpable, respirations of 31, oxygen saturations in low 70s. He was placed on nonrebreather, started on pressors. Subsequently he required to be orally intubated. He was maxed out on Levophed and he was started on epinephrine. His labs showed H&H of 9.9/29.3, INR of 1.3, blood glucose of 421, BUN/creatinine of 189/4.61 lactic acid of 3.2, albumin of 1.2. High-sensitivity troponin at 86, NT proBNP at 2919. His ammonia 31 head CT with no acute findings    Hospital Course : He was admitted to the ICU in gram negative septic shock secondary to multifocal pneumonia and was started on 3 vasopressors as well as broad-spectrum antibiotics. He was also intubated.  He was under the guardianship of [employer]. The following day after admission, they decided to make him DNR/DNI and comfort measures only due to his terminal prognosis. He was pronounced dead at 2307 on . Disposition:     Minutes spent on discharge: 20 minutes      Labs: Results:       Chemistry No results for input(s): GLU, NA, K, CL, CO2, BUN, CREA, CA, AGAP, BUCR, TBIL, AP, TP, ALB, GLOB, AGRAT in the last 72 hours. No lab exists for component: GPT   CBC w/Diff No results for input(s): WBC, RBC, HGB, HCT, PLT, GRANS, LYMPH, EOS, HGBEXT, HCTEXT, PLTEXT in the last 72 hours. Cardiac Enzymes No results for input(s): CPK, CKND1, CAROLYN in the last 72 hours. No lab exists for component: CKRMB, TROIP   Coagulation No results for input(s): PTP, INR, APTT, INREXT in the last 72 hours. Lipid Panel No results found for: CHOL, CHOLPOCT, CHOLX, CHLST, CHOLV, 856634, HDL, HDLP, LDL, LDLC, DLDLP, 009461, VLDLC, VLDL, TGLX, TRIGL, TRIGP, TGLPOCT, CHHD, CHHDX   BNP No results for input(s): BNPP in the last 72 hours. Liver Enzymes No results for input(s): TP, ALB, TBIL, AP in the last 72 hours. No lab exists for component: SGOT, GPT, DBIL   Thyroid Studies Lab Results   Component Value Date/Time    TSH 1.86 2022 12:10 PM            Significant Diagnostic Studies: CT HEAD WO CONT    Result Date: 2022  EXAM: CT head INDICATION: Altered mental status COMPARISON: None. TECHNIQUE: Axial CT imaging of the head was performed without intravenous contrast. Standard multiplanar coronal and sagittal reformatted images were obtained and are included in interpretation. One or more dose reduction techniques were used on this CT: automated exposure control, adjustment of the mAs and/or kVp according to patient size, and iterative reconstruction techniques. The specific techniques used on this CT exam have been documented in the patient's electronic medical record.   Digital Imaging and Communications in Medicine (DICOM) format image data are available to nonaffiliated external healthcare facilities or entities on a secure, media free, reciprocally searchable basis with patient authorization for at least a 12-month period after this study. _______________ FINDINGS: BRAIN AND POSTERIOR FOSSA: Cortical and cerebellar volume loss, within normal limits for patient age. Ventricular size and configuration appears within normal limits. Patent basilar cisterns. Moderate subcortical and periventricular white matter low-attenuation. There is no intracranial hemorrhage, mass effect, or midline shift. Gray-white matter differentiation is within normal limits. EXTRA-AXIAL SPACES AND MENINGES: There are no abnormal extra-axial fluid collections. CALVARIUM: Intact. SINUSES: Clear. OTHER: None. _______________     1. No acute intracranial abnormality. 2. Subcortical and periventricular white matter low-attenuation compatible with sequela of chronic ischemic microvascular change. CT CHEST ABD PELV WO CONT    Result Date: 8/25/2022  EXAM: CT chest, abdomen, and pelvis INDICATION: Altered mental status, sepsis, multifocal pneumonia. COMPARISON: Several prior radiographs obtained previously the same day. No prior CT imaging available for review. TECHNIQUE: Axial CT imaging of the chest, abdomen, and pelvis was performed without contrast administration. Multiplanar reformats were generated. One or more dose reduction techniques were used on this CT: automated exposure control, adjustment of the mAs and/or kVp according to patient size, and iterative reconstruction techniques. The specific techniques used on this CT exam have been documented in the patient's electronic medical record.   Digital Imaging and Communications in Medicine (DICOM) format image data are available to nonaffiliated external healthcare facilities or entities on a secure, media free, reciprocally searchable basis with patient authorization for at least a 12-month period after this study. _______________ FINDINGS: CHEST: MEDIASTINUM: Right IJ central venous catheter tip present in the distal IVC. Normal cardiac size. No pericardial effusion. Thoracic aorta is diffusely atherosclerotic but of normal course/caliber. LYMPH NODES: No pathologically enlarged mediastinal or hilar lymph nodes. PLEURA: Trace left effusion. LUNGS/AIRWAY: Endotracheal tube appropriately positioned within the intrathoracic trachea. Multifocal areas of groundglass opacity are noted, greatest in conspicuity across the left upper and right upper lobes with additional peribronchial consolidation. Dense consolidation throughout the lingula and left lower lobe. OTHER: None. ABDOMEN/PELVIS: LIVER, BILIARY: Scattered granulomata, otherwise unremarkable. No abnormal biliary dilation. Cholecystectomy. PANCREAS: Unremarkable. SPLEEN: Unremarkable. ADRENALS: Unremarkable. KIDNEYS: No hydronephrosis. No nephrolithiasis. LYMPH NODES: No pathologically enlarged lymph nodes. GASTROINTESTINAL TRACT: No abnormal bowel dilation or wall thickening. No bowel obstruction. No free intraperitoneal gas. Normal appendix. Gastric tube appropriately positioned within the stomach. PELVIC ORGANS: Urinary bladder is thick-walled, contains small foci of intravesicular gas, and is decompressed with a Morrison catheter in situ. VASCULATURE: Diffuse atherosclerosis without aneurysm OSSEOUS: Grade 1 anterolisthesis L4 on L5 with advanced same level spondylosis and facet arthropathy. OTHER: None. _______________     1. Within the chest:   > Endotracheal tube and right IJV central venous catheter both appropriately positioned.   > Multifocal pneumonia with dense consolidation throughout the left lower lobe. Trace left effusion. 2. Within the abdomen/pelvis:   > No abnormal bowel wall thickening or inflammatory change.   > No hydronephrosis. Urinary bladder decompressed with Morrison catheter in situ.  Small foci of nondependent gas within the bladder lumen likely secondary to indwelling catheter. XR CHEST PORT    Result Date: 8/26/2022  EXAM: XR CHEST PORT CLINICAL INDICATION/HISTORY: intubated -Additional: None COMPARISON: One day prior TECHNIQUE: Portable frontal view of the chest _______________ FINDINGS: SUPPORT DEVICES: Right IJV central venous catheter tip at the cavoatrial junction. Endotracheal tube in the midthoracic trachea. Enteric tube tip within the stomach. HEART AND MEDIASTINUM: Cardiomediastinal silhouette within normal limits. LUNGS AND PLEURAL SPACES: Bilateral patchy parenchymal opacities, left greater right with left pleural effusion. No pneumothorax. _______________     Bilateral patchy parenchymal opacities, left greater right, with left pleural effusion. XR CHEST PORT    Result Date: 8/25/2022  EXAM: XR CHEST PORT CLINICAL INDICATION/HISTORY: central line placement -Additional: None COMPARISON: Earlier same day TECHNIQUE: Portable frontal view of the chest _______________ FINDINGS: SUPPORT DEVICES: Interval placement right IJV central venous catheter tip at the cavoatrial junction. Endotracheal tube in the midthoracic trachea. Enteric tube tip within the stomach. HEART AND MEDIASTINUM: Cardiomediastinal silhouette within normal limits. LUNGS AND PLEURAL SPACES: Bilateral prominent perihilar opacities. Possible small left effusion. No pneumothorax. _______________     Interval placement right IJV central venous catheter tip at the cavoatrial junction. No pneumothorax. XR CHEST PORT    Result Date: 8/25/2022  EXAM: XR CHEST PORT CLINICAL INDICATION/HISTORY: Altered -Additional: None COMPARISON: None TECHNIQUE: Portable frontal view of the chest _______________ FINDINGS: SUPPORT DEVICES: Endotracheal tube in the midthoracic trachea. Enteric tube tip within the stomach. HEART AND MEDIASTINUM: Cardiomediastinal silhouette within normal limits. LUNGS AND PLEURAL SPACES: Bilateral prominent perihilar opacities.  Possible small left effusion. No pneumothorax. _______________     Multifocal parenchymal opacities and possible small left effusion. May represent multifocal pneumonia, edema or aspiration. ECHO ADULT COMPLETE    Result Date: 8/25/2022  Formatting of this result is different from the original.   Technical qualifiers: Echo study was technically difficult due to patient's body habitus. Left Ventricle: Normal left ventricular systolic function with a visually estimated EF of 50 - 55%. Left ventricle size is normal. Increased wall thickness. See diagram for wall motion findings. Grade I diastolic dysfunction present with normal LV EF. Mitral Valve: Valve structure is normal. Moderate annular calcification at the posterior leaflet of the mitral valve. Trace regurgitation. No stenosis noted. Tricuspid Valve: The estimated PASP is 36 mmHg. No results found for this or any previous visit.         CC: Radha Lee MD

## 2022-08-30 NOTE — PROGRESS NOTES
Bereavement Note:     responded to the death of Nava Sahrp, who is a 71 y.o., male, called guardian x4, left message to call the unit  see flow sheets for interventions. Date of Death: 2020    Extended Emergency Contact Information  Primary Emergency Contact: [address] [facility]  Mobile Phone: [phone]  Relation: Guardian                 YES      NO  UNKNOWN  [employer]   []        [x]    []   [employer]   [] [x] []   Medical Examiner  []        [x]  []   Going to [address] [place] [address]  [x]        [] []      Autopsy   []        [x]         []   Sympathy Card  []        [x]  Bereavement Materials  []        [x]           Business Card Provided  []        [x]              Home:     Chaplains will continue to follow family and will provide spiritual care as needed.   [address]   [phone] - Office

## 2022-08-30 NOTE — DEATH NOTE
Death Pronouncement Note    Patient's Name: Tl Torrez   Patient's YOB: 1950  MRN Number: [id number]    Admitting Provider: Laura Calloway MD  Attending Provider: Trudy Almanza MD     Patient was examined and the following were absent: Pulses, Blood Pressure, and Respiratory effort    I declared the patient dead on 8/29 at 2307    Preliminary Cause of Death: multifocal pneumonia    Electronically signed by Ruther Skiff, MD on 8/30/2022 at 12:22 AM
